# Patient Record
Sex: MALE | Race: WHITE | Employment: OTHER | URBAN - METROPOLITAN AREA
[De-identification: names, ages, dates, MRNs, and addresses within clinical notes are randomized per-mention and may not be internally consistent; named-entity substitution may affect disease eponyms.]

---

## 2017-06-28 ENCOUNTER — OFFICE VISIT (OUTPATIENT)
Dept: INTERNAL MEDICINE CLINIC | Facility: CLINIC | Age: 77
End: 2017-06-28

## 2017-06-28 VITALS
SYSTOLIC BLOOD PRESSURE: 122 MMHG | WEIGHT: 153 LBS | HEIGHT: 66 IN | DIASTOLIC BLOOD PRESSURE: 58 MMHG | BODY MASS INDEX: 24.59 KG/M2 | HEART RATE: 69 BPM

## 2017-06-28 DIAGNOSIS — D50.8 OTHER IRON DEFICIENCY ANEMIA: ICD-10-CM

## 2017-06-28 DIAGNOSIS — D03.39 MELANOMA IN SITU OF CHEEK (HCC): ICD-10-CM

## 2017-06-28 DIAGNOSIS — K22.70 BARRETT'S ESOPHAGUS WITHOUT DYSPLASIA: ICD-10-CM

## 2017-06-28 DIAGNOSIS — Z00.00 ANNUAL PHYSICAL EXAM: Primary | ICD-10-CM

## 2017-06-28 DIAGNOSIS — D01.0 CARCINOMA IN SITU OF COLON: ICD-10-CM

## 2017-06-28 DIAGNOSIS — K21.9 GASTROESOPHAGEAL REFLUX DISEASE, ESOPHAGITIS PRESENCE NOT SPECIFIED: ICD-10-CM

## 2017-06-28 DIAGNOSIS — Z00.00 ENCOUNTER FOR ANNUAL HEALTH EXAMINATION: ICD-10-CM

## 2017-06-28 DIAGNOSIS — D12.6 ADENOMATOUS POLYP OF COLON, UNSPECIFIED PART OF COLON: ICD-10-CM

## 2017-06-28 DIAGNOSIS — E78.00 HYPERCHOLESTEROLEMIA: ICD-10-CM

## 2017-06-28 PROCEDURE — G0439 PPPS, SUBSEQ VISIT: HCPCS | Performed by: INTERNAL MEDICINE

## 2017-06-28 NOTE — PATIENT INSTRUCTIONS
Please obtain blood tests soon when you can. Please continue to eat healthy, exercise regularly, and maintain your current body weight. Await colonoscopy and possible EGD in September. Continue regular follow-up with Dermatology.   Please remember to g covered service except at the Welcome to Medicare Visit    Abdominal aortic aneurysm screening (once between ages 73-68)  No results found for this or any previous visit.  Limited to patients who meet one of the following criteria:   • Men who are 65-75 yea for this or any previous visit.  Medium/high risk factors:   End-stage renal disease   Hemophiliacs who received Factor VIII or IX concentrates   Clients of institutions for the mentally retarded   Persons who live in the same house as a HepB virus carrier

## 2017-06-28 NOTE — PROGRESS NOTES
HPI:   Pedro Deluna is a 68year old male who presents this afternoon for a Medicare Subsequent Annual Wellness visit (Pt already had Initial Annual Wellness). He feels well today, and has no specific issues for discussion. Retired .   Jessika Buenrostro polyps     Stubbs's esophagus      Last Cholesterol Labs:     Lab Results  Component Value Date   CHOLEST 203 (H) 06/27/2016   HDL 35 06/27/2016    (H) 06/27/2016   TRIG 189 (H) 06/27/2016          Last Chemistry Labs:     Lab Results  Component Va quit smoking about 24 years ago. His smoking use included Cigarettes. He has a 36.00 pack-year smoking history. He does not have any smokeless tobacco history on file. He reports that he drinks alcohol. He reports that he does not use drugs.        REVIEW O get annoyed because I misunderstand what they say:  No   I misunderstand what others are saying and make inappropriate responses:  No I avoid social activities because I cannot hear well and fear I will reply improperly:  No   Family members and friends ha weight. Reinforced annual influenza vaccine. He will check on Zostavax status. Await upcoming colonoscopy and possible EGD in September.   Annual physical.    History of carcinoma in situ of colon  Stable status post resection with upcoming colonoscopy i B, Tetanus, or Pneumococcal?: Yes     Functional Ability     Bathing or Showering: Able without help    Toileting: Able without help    Dressing: Able without help    Eating: Able without help    Driving: Able without help    Preparing your meals: Able wit sheet to patient  PREVENTATIVE SERVICES  INDICATIONS AND SCHEDULE Internal Lab or Procedure External Lab or Procedure   Diabetes Screening      HbgA1C   Annually No results found for: A1C    No flowsheet data found.     Fasting Blood Sugar (FSB)Annually   G POTASSIUM (P) (mmol/L)   Date Value   06/27/2016 4.0    No flowsheet data found. Creatinine  Annually Creatinine (mg/dL)   Date Value   10/28/2016 1.18     CREATININE (P) (mg/dL)   Date Value   06/27/2016 1.26    No flowsheet data found.     Drug Ser

## 2017-09-22 ENCOUNTER — TELEPHONE (OUTPATIENT)
Dept: GASTROENTEROLOGY | Facility: CLINIC | Age: 77
End: 2017-09-22

## 2017-09-22 NOTE — TELEPHONE ENCOUNTER
Pt contacted and explained that I would send his instructions via SWYF for him to review and reference as needed. He was agreeable, instructions sent.

## 2017-09-22 NOTE — TELEPHONE ENCOUNTER
Pt indicates he received the prep for cln/procedure on 9/26 , pt is requesting the directions for the prep, pls call at:[phone],thanks.

## 2017-09-26 ENCOUNTER — LAB REQUISITION (OUTPATIENT)
Dept: LAB | Facility: HOSPITAL | Age: 77
End: 2017-09-26
Payer: MEDICARE

## 2017-09-26 DIAGNOSIS — Z01.89 ENCOUNTER FOR OTHER SPECIFIED SPECIAL EXAMINATIONS (CODE): ICD-10-CM

## 2017-09-26 PROCEDURE — 88305 TISSUE EXAM BY PATHOLOGIST: CPT | Performed by: INTERNAL MEDICINE

## 2017-09-27 ENCOUNTER — TELEPHONE (OUTPATIENT)
Dept: GASTROENTEROLOGY | Facility: CLINIC | Age: 77
End: 2017-09-27

## 2017-09-27 NOTE — TELEPHONE ENCOUNTER
----- Message from Osiel Mart MD sent at 9/27/2017 10:00 AM CDT -----  I wanted to get back to you with your colonoscopy results. You had  colon polyps removed which were benign.   I would advise a repeat colonoscopy in 1 year to make sure no new jackie

## 2017-10-11 ENCOUNTER — PATIENT MESSAGE (OUTPATIENT)
Dept: INTERNAL MEDICINE CLINIC | Facility: CLINIC | Age: 77
End: 2017-10-11

## 2018-05-08 ENCOUNTER — OFFICE VISIT (OUTPATIENT)
Dept: DERMATOLOGY CLINIC | Facility: CLINIC | Age: 78
End: 2018-05-08

## 2018-05-08 DIAGNOSIS — L82.1 SEBORRHEIC KERATOSES: ICD-10-CM

## 2018-05-08 DIAGNOSIS — L57.8 SUN-DAMAGED SKIN: ICD-10-CM

## 2018-05-08 DIAGNOSIS — D23.5 BENIGN NEOPLASM OF SKIN OF TRUNK, EXCEPT SCROTUM: ICD-10-CM

## 2018-05-08 DIAGNOSIS — D23.60 BENIGN NEOPLASM OF SKIN OF UPPER EXTREMITY AND SHOULDER, UNSPECIFIED LATERALITY: ICD-10-CM

## 2018-05-08 DIAGNOSIS — D23.70 BENIGN NEOPLASM OF SKIN OF LOWER EXTREMITY, INCLUDING HIP, UNSPECIFIED LATERALITY: ICD-10-CM

## 2018-05-08 DIAGNOSIS — D23.4 BENIGN NEOPLASM OF SCALP AND SKIN OF NECK: ICD-10-CM

## 2018-05-08 DIAGNOSIS — Z85.828 PERSONAL HISTORY OF SKIN CANCER: ICD-10-CM

## 2018-05-08 DIAGNOSIS — L57.0 ACTINIC KERATOSIS: ICD-10-CM

## 2018-05-08 DIAGNOSIS — D23.30 BENIGN NEOPLASM OF SKIN OF FACE: ICD-10-CM

## 2018-05-08 DIAGNOSIS — Z86.006 HISTORY OF MELANOMA IN SITU: Primary | ICD-10-CM

## 2018-05-08 PROCEDURE — 17000 DESTRUCT PREMALG LESION: CPT | Performed by: DERMATOLOGY

## 2018-05-08 PROCEDURE — 99203 OFFICE O/P NEW LOW 30 MIN: CPT | Performed by: DERMATOLOGY

## 2018-05-08 PROCEDURE — 17003 DESTRUCT PREMALG LES 2-14: CPT | Performed by: DERMATOLOGY

## 2018-05-08 NOTE — PROGRESS NOTES
HPI:     Chief Complaint     Lesion        HPI     Lesion    Additional comments: LOV: 7-14-14.  Pt presenting today with dark flat lesions to R upper thigh and rough patches to scalp and back and requesting full body skin eval. 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 Also exam of oral mucosa    The patient is alert, oriented, and appears their stated age.   Patient is well nourished and in no distress    The exam was remarkab use  of SPF 30 or higher, hats, discussed  Benign neoplasm of scalp and skin of neck  Benign neoplasm of skin of face  Benign neoplasm of skin of upper extremity and shoulder, unspecified laterality  Benign neoplasm of skin of lower extremity, including hi

## 2018-05-08 NOTE — PROGRESS NOTES
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

## 2018-10-03 ENCOUNTER — TELEPHONE (OUTPATIENT)
Dept: GASTROENTEROLOGY | Facility: CLINIC | Age: 78
End: 2018-10-03

## 2018-10-03 DIAGNOSIS — Z98.0 INTESTINAL BYPASS OR ANASTOMOSIS STATUS: Primary | ICD-10-CM

## 2018-10-03 DIAGNOSIS — Z86.010 HISTORY OF COLONIC POLYPS: ICD-10-CM

## 2018-10-03 DIAGNOSIS — K64.8 INTERNAL HEMORRHOIDS: ICD-10-CM

## 2018-10-03 NOTE — TELEPHONE ENCOUNTER
Last Procedure:  9/26/2017 CLN  Last Diagnosis:  The anastomosis of the right colon/ diverticulosis / internal hemorrhoids/ 3 colon polyps  Recalled for (years): 1 year  Sedation used previously:  MAC  Last Prep Used (if known):  Colyte   Quality of prep (

## 2018-10-15 NOTE — TELEPHONE ENCOUNTER
Scheduled for:   Colonoscopy [id number]  Provider Name: Dr. Will Baumann  Date:  11/10/18  Location:  [facility]  Sedation:  MAC  Time:  0730 (pt is aware to arrive at 0630)   Prep:  Colyte, sent via [employer]?:  Physician reviewed     Diagnosis with

## 2018-11-10 ENCOUNTER — HOSPITAL ENCOUNTER (OUTPATIENT)
Facility: HOSPITAL | Age: 78
Setting detail: HOSPITAL OUTPATIENT SURGERY
Discharge: HOME | End: 2018-11-10
Attending: INTERNAL MEDICINE | Admitting: INTERNAL MEDICINE
Payer: MEDICARE

## 2018-11-10 ENCOUNTER — ANESTHESIA EVENT (OUTPATIENT)
Dept: ENDOSCOPY | Facility: HOSPITAL | Age: 78
End: 2018-11-10
Payer: MEDICARE

## 2018-11-10 ENCOUNTER — ANESTHESIA (OUTPATIENT)
Dept: ENDOSCOPY | Facility: HOSPITAL | Age: 78
End: 2018-11-10
Payer: MEDICARE

## 2018-11-10 DIAGNOSIS — Z98.0 INTESTINAL BYPASS OR ANASTOMOSIS STATUS: ICD-10-CM

## 2018-11-10 DIAGNOSIS — Z86.010 HISTORY OF COLONIC POLYPS: ICD-10-CM

## 2018-11-10 DIAGNOSIS — K64.8 INTERNAL HEMORRHOIDS: ICD-10-CM

## 2018-11-10 PROCEDURE — 0DBP8ZX EXCISION OF RECTUM, VIA NATURAL OR ARTIFICIAL OPENING ENDOSCOPIC, DIAGNOSTIC: ICD-10-PCS | Performed by: INTERNAL MEDICINE

## 2018-11-10 PROCEDURE — 45380 COLONOSCOPY AND BIOPSY: CPT | Performed by: INTERNAL MEDICINE

## 2018-11-10 PROCEDURE — 45385 COLONOSCOPY W/LESION REMOVAL: CPT | Performed by: INTERNAL MEDICINE

## 2018-11-10 PROCEDURE — 0DBN8ZX EXCISION OF SIGMOID COLON, VIA NATURAL OR ARTIFICIAL OPENING ENDOSCOPIC, DIAGNOSTIC: ICD-10-PCS | Performed by: INTERNAL MEDICINE

## 2018-11-10 RX ORDER — ONDANSETRON 2 MG/ML
4 INJECTION INTRAMUSCULAR; INTRAVENOUS ONCE AS NEEDED
Status: DISCONTINUED | OUTPATIENT
Start: 2018-11-10 | End: 2018-11-10

## 2018-11-10 RX ORDER — SODIUM CHLORIDE, SODIUM LACTATE, POTASSIUM CHLORIDE, CALCIUM CHLORIDE 600; 310; 30; 20 MG/100ML; MG/100ML; MG/100ML; MG/100ML
INJECTION, SOLUTION INTRAVENOUS CONTINUOUS
Status: DISCONTINUED | OUTPATIENT
Start: 2018-11-10 | End: 2018-11-10

## 2018-11-10 RX ORDER — LIDOCAINE HYDROCHLORIDE 10 MG/ML
INJECTION, SOLUTION EPIDURAL; INFILTRATION; INTRACAUDAL; PERINEURAL AS NEEDED
Status: DISCONTINUED | OUTPATIENT
Start: 2018-11-10 | End: 2018-11-10 | Stop reason: SURG

## 2018-11-10 RX ORDER — NALOXONE HYDROCHLORIDE 0.4 MG/ML
80 INJECTION, SOLUTION INTRAMUSCULAR; INTRAVENOUS; SUBCUTANEOUS AS NEEDED
Status: DISCONTINUED | OUTPATIENT
Start: 2018-11-10 | End: 2018-11-10

## 2018-11-10 RX ADMIN — LIDOCAINE HYDROCHLORIDE 50 MG: 10 INJECTION, SOLUTION EPIDURAL; INFILTRATION; INTRACAUDAL; PERINEURAL at 07:37:00

## 2018-11-10 RX ADMIN — SODIUM CHLORIDE, SODIUM LACTATE, POTASSIUM CHLORIDE, CALCIUM CHLORIDE: 600; 310; 30; 20 INJECTION, SOLUTION INTRAVENOUS at 07:58:00

## 2018-11-10 RX ADMIN — SODIUM CHLORIDE, SODIUM LACTATE, POTASSIUM CHLORIDE, CALCIUM CHLORIDE: 600; 310; 30; 20 INJECTION, SOLUTION INTRAVENOUS at 07:35:00

## 2018-11-10 NOTE — H&P
History & Physical Examination    Patient Name: Alma Rosa Boudreaux  MRN: [id number]  [facility]: [id number]  YOB: 1940    Diagnosis: history of cancerous colon polyp    Medications Prior to Admission:  Pseudoephedrine-Acetaminophen (ALLEREST OR) Take 1 Problem Relation Age of Onset   • Breast Cancer Mother 79        Metastatic to brain   • Breast Cancer Sister 48   • Hypertension Brother    • Diabetes Brother    • Other (COPD) Father    • Other (COPD) Sister      Social History    Tobacco Use      Smok

## 2018-11-10 NOTE — OPERATIVE REPORT
[facility] [place] [facility] Endoscopy Report      Preoperative Diagnosis:  - cancerous colon polyp surgically removed      Postoperative Diagnosis:  - colon polyps x 4  - patent anastomosis  - diverticulosis  - internal hemorrhoids      Procedure:    Colono

## 2018-11-10 NOTE — ANESTHESIA PREPROCEDURE EVALUATION
Anesthesia PreOp Note    HPI:     Aroldo Linton is a 68year old male who presents for preoperative consultation requested by: Curtis Garcia MD    Date of Surgery: 11/10/2018    Procedure(s):  COLONOSCOPY  Indication: Intestinal bypass or anastomosis COLECTOMY, PARTIAL, W/ ANASTOMOS Right October 2016   • REPAIR RECURR [id number] [facility] [address] Right 1975    With right orchiectomy   • SKIN SURGERY  July 2014    Excision melanoma in situ right cheek and BCCa scalp   • TONSILLECTOMY  1948         Medications tobacco: Never Used    Substance and Sexual Activity      Alcohol use:  Yes        Alcohol/week: 0.0 oz        Comment: Infrequent      Drug use: No      Sexual activity: Not on file    Other Topics      Concerns:        Grew up on a farm: Not Asked spouse  Discussed plan with:  Surgeon      I have informed Claudette Langton and/or legal guardian or family member of the nature of the anesthetic plan, benefits, risks including possible dental damage if relevant, major complications, and any alternative fo

## 2018-11-10 NOTE — ANESTHESIA POSTPROCEDURE EVALUATION
Patient: Bisi Adams    Procedure Summary     Date:  11/10/18 Room / Location:  [facility] ENDOSCOPY 01 / [facility] ENDOSCOPY    Anesthesia Start:  9015 Anesthesia Stop:  6786    Procedure:  COLONOSCOPY (N/A ) Diagnosis:       Intestinal bypass or anastomosis status

## 2018-11-12 ENCOUNTER — OFFICE VISIT (OUTPATIENT)
Dept: DERMATOLOGY CLINIC | Facility: CLINIC | Age: 78
End: 2018-11-12
Payer: MEDICARE

## 2018-11-12 ENCOUNTER — TELEPHONE (OUTPATIENT)
Dept: GASTROENTEROLOGY | Facility: CLINIC | Age: 78
End: 2018-11-12

## 2018-11-12 VITALS — BODY MASS INDEX: 24.91 KG/M2 | HEIGHT: 66 IN | WEIGHT: 155 LBS

## 2018-11-12 VITALS
OXYGEN SATURATION: 96 % | HEIGHT: 66 IN | DIASTOLIC BLOOD PRESSURE: 65 MMHG | SYSTOLIC BLOOD PRESSURE: 116 MMHG | RESPIRATION RATE: 14 BRPM | WEIGHT: 160 LBS | HEART RATE: 69 BPM | BODY MASS INDEX: 25.71 KG/M2

## 2018-11-12 DIAGNOSIS — D23.30 BENIGN NEOPLASM OF SKIN OF FACE: ICD-10-CM

## 2018-11-12 DIAGNOSIS — D23.60 BENIGN NEOPLASM OF SKIN OF UPPER EXTREMITY AND SHOULDER, UNSPECIFIED LATERALITY: ICD-10-CM

## 2018-11-12 DIAGNOSIS — Z85.828 PERSONAL HISTORY OF SKIN CANCER: ICD-10-CM

## 2018-11-12 DIAGNOSIS — D23.4 BENIGN NEOPLASM OF SCALP AND SKIN OF NECK: ICD-10-CM

## 2018-11-12 DIAGNOSIS — L57.8 SUN-DAMAGED SKIN: ICD-10-CM

## 2018-11-12 DIAGNOSIS — D23.5 BENIGN NEOPLASM OF SKIN OF TRUNK, EXCEPT SCROTUM: ICD-10-CM

## 2018-11-12 DIAGNOSIS — Z86.006 HISTORY OF MELANOMA IN SITU: ICD-10-CM

## 2018-11-12 DIAGNOSIS — L57.0 ACTINIC KERATOSIS: Primary | ICD-10-CM

## 2018-11-12 DIAGNOSIS — L82.1 SEBORRHEIC KERATOSES: ICD-10-CM

## 2018-11-12 PROCEDURE — 99213 OFFICE O/P EST LOW 20 MIN: CPT | Performed by: DERMATOLOGY

## 2018-11-12 PROCEDURE — G0463 HOSPITAL OUTPT CLINIC VISIT: HCPCS | Performed by: DERMATOLOGY

## 2018-11-12 RX ORDER — FLUOROURACIL 50 MG/G
1 CREAM TOPICAL EVERY EVENING
Qty: 40 G | Refills: 0 | Status: SHIPPED | OUTPATIENT
Start: 2018-11-12 | End: 2018-12-10

## 2018-11-12 NOTE — PROGRESS NOTES
HPI:     Chief Complaint     Upper Body Exam        HPI     Upper Body Exam      Additional comments: 6 month check ,patient concerned with some ruff patches of skin on forehead ,personal HX of AK's and Melanoma           Last edited by James Hardin CM deficiency anemia June 2016   • [facility]) July 2014   • Paraesophageal hiatal hernia    • Pneumonia 1985     Past Surgical History:   Procedure Laterality Date   • APPENDECTOMY  1956   • CATARACT EXTRACTION Bilateral  2015   • COLECTO Asked        Caffeine Concern: Yes          Coffee 3 cups daily        Occupational Exposure: Not Asked        Hobby Hazards: Not Asked        Sleep Concern: Not Asked        Stress Concern: Not Asked        Weight Concern: Not Asked        Special Diet: N including marked inflammation. Patient will like to wait to start until after a wedding that is right after Thanksgiving. I told him that is fine. We will therefore recheck in 3 months.   No recurrent or suspicious lesions–patient will follow-up in 6 mon

## 2018-11-12 NOTE — PATIENT INSTRUCTIONS
After the wedding you are going to, start applying the 5-fluorouracil cream every evening for 4 weeks. It will get red crusty, and more scaly at that time. May use Vaseline in the morning to help soothe.

## 2018-11-12 NOTE — TELEPHONE ENCOUNTER
Entered into Epic:Recall colon in 3 years per Dr. Salomón Higgins. Last Colon done 11/10/18, next due 11/10/21. Snapshot updated. Letter mailed.

## 2018-11-12 NOTE — TELEPHONE ENCOUNTER
----- Message from Michelle Jensen MD sent at 11/12/2018  2:47 PM CST -----  I wanted to get back to you with your colonoscopy results. You had 4 colon polyps removed which were benign.   I would advise a repeat colonoscopy in 3 years to make sure no new

## 2019-02-18 ENCOUNTER — OFFICE VISIT (OUTPATIENT)
Dept: DERMATOLOGY CLINIC | Facility: CLINIC | Age: 79
End: 2019-02-18
Payer: MEDICARE

## 2019-02-18 DIAGNOSIS — L57.0 ACTINIC KERATOSIS: ICD-10-CM

## 2019-02-18 DIAGNOSIS — L21.9 SEBORRHEIC DERMATITIS: Primary | ICD-10-CM

## 2019-02-18 DIAGNOSIS — Z86.006 HISTORY OF MELANOMA IN SITU: ICD-10-CM

## 2019-02-18 PROCEDURE — G0463 HOSPITAL OUTPT CLINIC VISIT: HCPCS | Performed by: DERMATOLOGY

## 2019-02-18 PROCEDURE — 99213 OFFICE O/P EST LOW 20 MIN: CPT | Performed by: DERMATOLOGY

## 2019-02-18 RX ORDER — KETOCONAZOLE 20 MG/ML
SHAMPOO TOPICAL
Qty: 120 ML | Refills: 3 | Status: SHIPPED | OUTPATIENT
Start: 2019-02-18 | End: 2019-09-16

## 2019-02-18 NOTE — PROGRESS NOTES
HPI:     Chief Complaint     Lesion        HPI     Lesion      Additional comments: Patient present for follow up - lesions has healed with treatment          Last edited by Michelle Elaine on 2/18/2019  1:24 PM. (History)        Of note patient was seen cheek [facility]) July 2014   • Paraesophageal hiatal hernia    • Pneumonia 1985     Past Surgical History:   Procedure Laterality Date   • APPENDECTOMY  1956   • CATARACT EXTRACTION Bilateral  2015   • COLECTOMY     • COLONOSCOPY N/A 11/10/2018    Performed by member of club or organization: Not on file        Attends meetings of clubs or organizations: Not on file        Relationship status: Not on file      Intimate partner violence:        Fear of current or ex partner: Not on file        Emotionally abused: exposed to actinic damage or actinic keratoses. I would therefore like to treat with ketoconazole shampoo to be used twice weekly and 2-1/2% hydrocortisone cream daily as needed only for scaling or dryness.   We will reassess when he comes back for his nex

## 2019-05-10 ENCOUNTER — PATIENT MESSAGE (OUTPATIENT)
Dept: DERMATOLOGY CLINIC | Facility: CLINIC | Age: 79
End: 2019-05-10

## 2019-05-10 NOTE — TELEPHONE ENCOUNTER
From: Maite Jacobs  To: Kishore Crowe MD  Sent: 5/10/2019 1:17 PM CDT  Subject: Other    I have an appointment scheduled for May 13, 2019 that I must cancel. I will not be in [address] on that date. I will have to re-schedule for later in the month.

## 2019-06-17 ENCOUNTER — NURSE TRIAGE (OUTPATIENT)
Dept: INTERNAL MEDICINE CLINIC | Facility: CLINIC | Age: 79
End: 2019-06-17

## 2019-06-17 ENCOUNTER — OFFICE VISIT (OUTPATIENT)
Dept: INTERNAL MEDICINE CLINIC | Facility: CLINIC | Age: 79
End: 2019-06-17
Payer: MEDICARE

## 2019-06-17 VITALS
HEART RATE: 71 BPM | DIASTOLIC BLOOD PRESSURE: 80 MMHG | HEIGHT: 64.5 IN | WEIGHT: 158 LBS | BODY MASS INDEX: 26.65 KG/M2 | TEMPERATURE: 98 F | SYSTOLIC BLOOD PRESSURE: 138 MMHG

## 2019-06-17 DIAGNOSIS — K62.5 RECTAL BLEEDING: Primary | ICD-10-CM

## 2019-06-17 PROCEDURE — 99213 OFFICE O/P EST LOW 20 MIN: CPT | Performed by: INTERNAL MEDICINE

## 2019-06-17 PROCEDURE — G0463 HOSPITAL OUTPT CLINIC VISIT: HCPCS | Performed by: INTERNAL MEDICINE

## 2019-06-17 NOTE — PATIENT INSTRUCTIONS
Monitor closely for recurrent bleeding. Use sitz baths twice daily as needed. Use Anusol suppositories twice daily as needed. Follow-up with Dr. Cris Valdez if bleeding recurs and persists.

## 2019-06-17 NOTE — PROGRESS NOTES
Loco Prasad is a 66year old male.  Patient presents with:  Blood In Stool    HPI:   On Saturday, 2 days ago, he had a single episode of rectal bleeding, noticing bright red blood in small amounts both in the toilet water after a bowel movement and also disease)    • Iron deficiency anemia June 2016   • Melanoma in situ of [facility]) July 2014   • Paraesophageal hiatal hernia    • Pneumonia 1985     Past Surgical History:   Procedure Laterality Date   • APPENDECTOMY  1956   • CATARACT EXTRACTION Bilatera hemorrhoidal.  Most recent colonoscopy November 2018. Close observation for recurrence. Sitz bath's twice daily as needed. Anusol suppositories 25 mg twice daily as needed. Prescription sent to pharmacy.   Recommend follow-up with GI if bleeding recurs

## 2019-06-17 NOTE — TELEPHONE ENCOUNTER
Action Requested: Summary for Provider     []  Critical Lab, Recommendations Needed  [] Need Additional Advice  []   FYI    []   Need Orders  [] Need Medications Sent to Pharmacy  []  Other     SUMMARY: Per protocol advised OV today.  Scheduled today with D

## 2019-08-12 ENCOUNTER — PATIENT MESSAGE (OUTPATIENT)
Dept: GASTROENTEROLOGY | Facility: CLINIC | Age: 79
End: 2019-08-12

## 2019-08-13 NOTE — TELEPHONE ENCOUNTER
From: Daniel Bishop  To: Evelyne Zamorano MD  Sent: 8/12/2019 9:19 PM CDT  Subject: Other    I need to cancel my appointment for Aug 13th. I am not in [place] at the moment and cannot make it.

## 2019-09-16 ENCOUNTER — OFFICE VISIT (OUTPATIENT)
Dept: INTERNAL MEDICINE CLINIC | Facility: CLINIC | Age: 79
End: 2019-09-16
Payer: MEDICARE

## 2019-09-16 VITALS
DIASTOLIC BLOOD PRESSURE: 76 MMHG | WEIGHT: 159.69 LBS | BODY MASS INDEX: 26.6 KG/M2 | HEART RATE: 106 BPM | HEIGHT: 65 IN | SYSTOLIC BLOOD PRESSURE: 132 MMHG

## 2019-09-16 DIAGNOSIS — K22.70 BARRETT'S ESOPHAGUS WITHOUT DYSPLASIA: ICD-10-CM

## 2019-09-16 DIAGNOSIS — D12.6 ADENOMATOUS POLYP OF COLON, UNSPECIFIED PART OF COLON: ICD-10-CM

## 2019-09-16 DIAGNOSIS — I70.0 AORTIC ATHEROSCLEROSIS (HCC): ICD-10-CM

## 2019-09-16 DIAGNOSIS — Z00.00 ANNUAL PHYSICAL EXAM: Primary | ICD-10-CM

## 2019-09-16 DIAGNOSIS — E78.00 HYPERCHOLESTEROLEMIA: ICD-10-CM

## 2019-09-16 DIAGNOSIS — Z86.006 HISTORY OF MELANOMA IN SITU: ICD-10-CM

## 2019-09-16 DIAGNOSIS — D01.0 CARCINOMA IN SITU OF COLON: ICD-10-CM

## 2019-09-16 DIAGNOSIS — Z00.00 ENCOUNTER FOR ANNUAL HEALTH EXAMINATION: ICD-10-CM

## 2019-09-16 DIAGNOSIS — R91.8 LUNG FIELD ABNORMAL FINDING ON EXAMINATION: ICD-10-CM

## 2019-09-16 DIAGNOSIS — D50.8 OTHER IRON DEFICIENCY ANEMIA: ICD-10-CM

## 2019-09-16 DIAGNOSIS — K21.9 GASTROESOPHAGEAL REFLUX DISEASE, ESOPHAGITIS PRESENCE NOT SPECIFIED: ICD-10-CM

## 2019-09-16 PROCEDURE — 90662 IIV NO PRSV INCREASED AG IM: CPT | Performed by: INTERNAL MEDICINE

## 2019-09-16 PROCEDURE — G0008 ADMIN INFLUENZA VIRUS VAC: HCPCS | Performed by: INTERNAL MEDICINE

## 2019-09-16 PROCEDURE — G0439 PPPS, SUBSEQ VISIT: HCPCS | Performed by: INTERNAL MEDICINE

## 2019-09-16 PROCEDURE — 99213 OFFICE O/P EST LOW 20 MIN: CPT | Performed by: INTERNAL MEDICINE

## 2019-09-16 PROCEDURE — G0463 HOSPITAL OUTPT CLINIC VISIT: HCPCS | Performed by: INTERNAL MEDICINE

## 2019-09-17 ENCOUNTER — HOSPITAL ENCOUNTER (OUTPATIENT)
Dept: GENERAL RADIOLOGY | Facility: HOSPITAL | Age: 79
Discharge: HOME | End: 2019-09-17
Attending: INTERNAL MEDICINE
Payer: MEDICARE

## 2019-09-17 ENCOUNTER — APPOINTMENT (OUTPATIENT)
Dept: LAB | Facility: HOSPITAL | Age: 79
End: 2019-09-17
Attending: INTERNAL MEDICINE
Payer: MEDICARE

## 2019-09-17 DIAGNOSIS — E78.00 HYPERCHOLESTEROLEMIA: ICD-10-CM

## 2019-09-17 DIAGNOSIS — D50.8 OTHER IRON DEFICIENCY ANEMIA: ICD-10-CM

## 2019-09-17 DIAGNOSIS — R91.8 LUNG FIELD ABNORMAL FINDING ON EXAMINATION: ICD-10-CM

## 2019-09-17 LAB
ALBUMIN SERPL-MCNC: 3.9 G/DL (ref 3.4–5)
ALBUMIN/GLOB SERPL: 1.2 {RATIO} (ref 1–2)
ALP LIVER SERPL-CCNC: 81 U/L (ref 45–117)
ALT SERPL-CCNC: 20 U/L (ref 16–61)
ANION GAP SERPL CALC-SCNC: 6 MMOL/L (ref 0–18)
AST SERPL-CCNC: 20 U/L (ref 15–37)
BILIRUB SERPL-MCNC: 0.7 MG/DL (ref 0.1–2)
BUN BLD-MCNC: 15 MG/DL (ref 7–18)
BUN/CREAT SERPL: 10.2 (ref 10–20)
CALCIUM BLD-MCNC: 9.3 MG/DL (ref 8.5–10.1)
CHLORIDE SERPL-SCNC: 106 MMOL/L (ref 98–112)
CHOLEST SMN-MCNC: 207 MG/DL (ref ?–200)
CO2 SERPL-SCNC: 29 MMOL/L (ref 21–32)
CREAT BLD-MCNC: 1.47 MG/DL (ref 0.7–1.3)
DEPRECATED RDW RBC AUTO: 44.8 FL (ref 35.1–46.3)
ERYTHROCYTE [DISTWIDTH] IN BLOOD BY AUTOMATED COUNT: 13.2 % (ref 11–15)
GLOBULIN PLAS-MCNC: 3.2 G/DL (ref 2.8–4.4)
GLUCOSE BLD-MCNC: 105 MG/DL (ref 70–99)
HCT VFR BLD AUTO: 45.4 % (ref 39–53)
HDLC SERPL-MCNC: 34 MG/DL (ref 40–59)
HGB BLD-MCNC: 15.1 G/DL (ref 13–17.5)
LDLC SERPL CALC-MCNC: 135 MG/DL (ref ?–100)
M PROTEIN MFR SERPL ELPH: 7.1 G/DL (ref 6.4–8.2)
MCH RBC QN AUTO: 30.6 PG (ref 26–34)
MCHC RBC AUTO-ENTMCNC: 33.3 G/DL (ref 31–37)
MCV RBC AUTO: 92.1 FL (ref 80–100)
NONHDLC SERPL-MCNC: 173 MG/DL (ref ?–130)
OSMOLALITY SERPL CALC.SUM OF ELEC: 293 MOSM/KG (ref 275–295)
PATIENT FASTING: YES
PATIENT FASTING: YES
PLATELET # BLD AUTO: 248 10(3)UL (ref 150–450)
POTASSIUM SERPL-SCNC: 4.4 MMOL/L (ref 3.5–5.1)
RBC # BLD AUTO: 4.93 X10(6)UL (ref 3.8–5.8)
SODIUM SERPL-SCNC: 141 MMOL/L (ref 136–145)
TRIGL SERPL-MCNC: 190 MG/DL (ref 30–149)
VLDLC SERPL CALC-MCNC: 38 MG/DL (ref 0–30)
WBC # BLD AUTO: 8.5 X10(3) UL (ref 4–11)

## 2019-09-17 PROCEDURE — 71046 X-RAY EXAM CHEST 2 VIEWS: CPT | Performed by: INTERNAL MEDICINE

## 2019-09-17 PROCEDURE — 85027 COMPLETE CBC AUTOMATED: CPT

## 2019-09-17 PROCEDURE — 36415 COLL VENOUS BLD VENIPUNCTURE: CPT

## 2019-09-17 PROCEDURE — 80053 COMPREHEN METABOLIC PANEL: CPT

## 2019-09-17 PROCEDURE — 80061 LIPID PANEL: CPT

## 2019-09-17 NOTE — PROGRESS NOTES
HPI:   Edu Shelton is a 66year old male who presents this evening for a Medicare Subsequent Annual Wellness visit (Pt already had Initial Annual Wellness). His last Medicare physical was June 2017. Labs ordered at that time were not done.   He feels Discussed         He has a Power of  for [employer] on file [facility] He smoked tobacco in the past but quit greater than 12 months ago. Social History    Tobacco Use      Smoking status: Former Smoker        Packs/day: 1.00        Years: 39. 0 Medications Marked as Taking for the 9/16/19 encounter (Office Visit) with Denver Silber, MD:  Pseudoephedrine-Acetaminophen (ALLEREST OR) Take 1 tablet by mouth daily. Ferrous Sulfate (IRON) 325 (65 FE) MG Oral Tab Take 65 mg by mouth [email or web address].      Landrum Merlin anorexia heartburn dysphagia nausea vomiting abdominal pain diarrhea constipation or rectal bleeding  : Occasional urinary frequency.   No nocturia dysuria or hematuria  MUSCULOSKELETAL: No leg swelling  NEURO: No headaches    EXAM:   /76   Pulse 10 Uncorrected Left Eye Chart Acuity: 20/80   Both Eyes Visual Acuity: Uncorrected Both Eyes Chart Acuity: 20/60   Able To Tolerate Visual Acuity: Yes        EXAM:   GENERAL: Pleasant male appearing well in no distress  /76   Pulse 106   Ht 5' 5\" (1.65 anemia  On iron supplementation. Await labs  -     CBC, PLATELET; NO DIFFERENTIAL;  Future    History of carcinoma in situ of colon  Stable status post colon resection    History of adenomatous polyp of colon, unspecified part of colon  Up-to-date on colon every 10 years Colonoscopy due on 11/10/2021 Update [facility] if applicable    Flex Sigmoidoscopy Screen every 10 years No results found for this or any previous visit. No flowsheet data found.      Fecal Occult Blood Annually No results found for:

## 2019-09-17 NOTE — PATIENT INSTRUCTIONS
You received a high-dose flu shot today. Please obtain blood tests and a chest x-ray soon when you can. Continue healthy diet and regular walking.   Annual physical.  Lyric Dominique's SCREENING SCHEDULE   Tests on this list are recommended by your physici Men who are 73-68 years old and have smoked more than 100 cigarettes in their lifetime   • Anyone with a family history    Colorectal Cancer Screening Covered up to Age 76     Colonoscopy Screen   Covered every 10 years- more often if abnormal Colonoscopy live in the same house as a HepB virus carrier   Homosexual men   Illicit injectable drug abusers     Tetanus Toxoid- Only covered with a cut with metal- TD and TDaP Not covered by Medicare Part B) No orders found for this or any previous visit.  This may b

## 2019-10-28 ENCOUNTER — OFFICE VISIT (OUTPATIENT)
Dept: DERMATOLOGY CLINIC | Facility: CLINIC | Age: 79
End: 2019-10-28
Payer: MEDICARE

## 2019-10-28 DIAGNOSIS — L57.8 SUN-DAMAGED SKIN: ICD-10-CM

## 2019-10-28 DIAGNOSIS — D23.60 BENIGN NEOPLASM OF SKIN OF UPPER EXTREMITY AND SHOULDER, UNSPECIFIED LATERALITY: ICD-10-CM

## 2019-10-28 DIAGNOSIS — D23.70 BENIGN NEOPLASM OF SKIN OF LOWER EXTREMITY, INCLUDING HIP, UNSPECIFIED LATERALITY: ICD-10-CM

## 2019-10-28 DIAGNOSIS — D23.4 BENIGN NEOPLASM OF SCALP AND SKIN OF NECK: ICD-10-CM

## 2019-10-28 DIAGNOSIS — D23.30 BENIGN NEOPLASM OF SKIN OF FACE: ICD-10-CM

## 2019-10-28 DIAGNOSIS — D23.5 BENIGN NEOPLASM OF SKIN OF TRUNK, EXCEPT SCROTUM: ICD-10-CM

## 2019-10-28 DIAGNOSIS — Z86.006 HISTORY OF MELANOMA IN SITU: ICD-10-CM

## 2019-10-28 DIAGNOSIS — L57.0 ACTINIC KERATOSIS: Primary | ICD-10-CM

## 2019-10-28 DIAGNOSIS — Z85.828 PERSONAL HISTORY OF SKIN CANCER: ICD-10-CM

## 2019-10-28 DIAGNOSIS — L82.1 SEBORRHEIC KERATOSES: ICD-10-CM

## 2019-10-28 PROCEDURE — 17003 DESTRUCT PREMALG LES 2-14: CPT | Performed by: DERMATOLOGY

## 2019-10-28 PROCEDURE — 17000 DESTRUCT PREMALG LESION: CPT | Performed by: DERMATOLOGY

## 2019-10-28 PROCEDURE — G0463 HOSPITAL OUTPT CLINIC VISIT: HCPCS | Performed by: DERMATOLOGY

## 2019-10-28 PROCEDURE — 99214 OFFICE O/P EST MOD 30 MIN: CPT | Performed by: DERMATOLOGY
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
## 2020-01-28 ENCOUNTER — OFFICE VISIT (OUTPATIENT)
Dept: DERMATOLOGY CLINIC | Facility: CLINIC | Age: 80
End: 2020-01-28
Payer: MEDICARE

## 2020-01-28 DIAGNOSIS — Z86.006 HISTORY OF MELANOMA IN SITU: ICD-10-CM

## 2020-01-28 DIAGNOSIS — L57.0 ACTINIC KERATOSIS: Primary | ICD-10-CM

## 2020-01-28 PROCEDURE — 17003 DESTRUCT PREMALG LES 2-14: CPT | Performed by: DERMATOLOGY

## 2020-01-28 PROCEDURE — 99212 OFFICE O/P EST SF 10 MIN: CPT | Performed by: DERMATOLOGY

## 2020-01-28 PROCEDURE — G0463 HOSPITAL OUTPT CLINIC VISIT: HCPCS | Performed by: DERMATOLOGY

## 2020-01-28 PROCEDURE — 17000 DESTRUCT PREMALG LESION: CPT | Performed by: DERMATOLOGY

## 2020-01-28 NOTE — PROGRESS NOTES
HPI:     Chief Complaint     Actinic Keratosis        HPI     Actinic Keratosis      Additional comments: LOV 10/28/19. pt presenting today with 3 month Ak f/u to scalp. no new concerns. pt has HX of BCC, Melanoma          Last edited by Camila Lee hernia    • Pneumonia 1985     Past Surgical History:   Procedure Laterality Date   • APPENDECTOMY  1956   • CATARACT EXTRACTION Bilateral  2015   • COLONOSCOPY N/A 11/10/2018    Performed by Louie Horvath MD at [address] [place] [address] ENDOSCOPY   • COLONOSCOPY & POLYPECT Attends meetings of clubs or organizations: Not on file        Relationship status: Not on file      Intimate partner violence:        Fear of current or ex partner: Not on file        Emotionally abused: Not on file        Physically abused: Not on file at this time. Patient will follow-up in 6 months for another full check. Monthly self exams. History of melanoma in situ-no evidence of recurrence–monthly self exams.   Patient will follow-up in 6 months for full exam.    Orders Placed This Encounter

## 2020-03-30 ENCOUNTER — TELEPHONE (OUTPATIENT)
Dept: INTERNAL MEDICINE CLINIC | Facility: CLINIC | Age: 80
End: 2020-03-30

## 2020-08-25 ENCOUNTER — OFFICE VISIT (OUTPATIENT)
Dept: DERMATOLOGY CLINIC | Facility: CLINIC | Age: 80
End: 2020-08-25
Payer: MEDICARE

## 2020-08-25 DIAGNOSIS — D23.60 BENIGN NEOPLASM OF SKIN OF UPPER EXTREMITY AND SHOULDER, UNSPECIFIED LATERALITY: ICD-10-CM

## 2020-08-25 DIAGNOSIS — L57.0 ACTINIC KERATOSIS: Primary | ICD-10-CM

## 2020-08-25 DIAGNOSIS — L57.8 SUN-DAMAGED SKIN: ICD-10-CM

## 2020-08-25 DIAGNOSIS — D23.70 BENIGN NEOPLASM OF SKIN OF LOWER EXTREMITY, INCLUDING HIP, UNSPECIFIED LATERALITY: ICD-10-CM

## 2020-08-25 DIAGNOSIS — Z86.006 HISTORY OF MELANOMA IN SITU: ICD-10-CM

## 2020-08-25 DIAGNOSIS — D23.30 BENIGN NEOPLASM OF SKIN OF FACE: ICD-10-CM

## 2020-08-25 DIAGNOSIS — Z85.828 HISTORY OF BASAL CELL CARCINOMA: ICD-10-CM

## 2020-08-25 DIAGNOSIS — D23.5 BENIGN NEOPLASM OF SKIN OF TRUNK, EXCEPT SCROTUM: ICD-10-CM

## 2020-08-25 DIAGNOSIS — L82.1 SEBORRHEIC KERATOSES: ICD-10-CM

## 2020-08-25 DIAGNOSIS — D23.4 BENIGN NEOPLASM OF SCALP AND SKIN OF NECK: ICD-10-CM

## 2020-08-25 PROCEDURE — 99214 OFFICE O/P EST MOD 30 MIN: CPT | Performed by: DERMATOLOGY

## 2020-08-25 PROCEDURE — 17000 DESTRUCT PREMALG LESION: CPT | Performed by: DERMATOLOGY

## 2020-08-25 PROCEDURE — G0463 HOSPITAL OUTPT CLINIC VISIT: HCPCS | Performed by: DERMATOLOGY

## 2020-08-25 PROCEDURE — 17003 DESTRUCT PREMALG LES 2-14: CPT | Performed by: DERMATOLOGY

## 2020-08-25 NOTE — PROGRESS NOTES
HPI:     Chief Complaint     Full Skin Exam        HPI     Full Skin Exam      Additional comments: LOV 1/28/2020 Patient present for full body skin exam . 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  Patient is well nourished and in no distress    The exam was remarkable for the following:  - there is no visible or palpable evidence of recurrent melanoma in situ from right cheek. Also no evidence of recurrent basal cell carcinoma from the scalp. extremity and shoulder, unspecified laterality  Benign neoplasm of skin of lower extremity, including hip, unspecified laterality    Orders Placed This Encounter      DESTRUCTION PREMALIGNANT LESIONS, FIRST LES      DESTRUCTION PREMALIGNANT LESIONS, 2ND -

## 2021-01-15 ENCOUNTER — PATIENT MESSAGE (OUTPATIENT)
Dept: GASTROENTEROLOGY | Facility: CLINIC | Age: 81
End: 2021-01-15

## 2021-01-15 NOTE — TELEPHONE ENCOUNTER
From: Cristina Terrell  To: Eldon Horan. Madan Barone MD  Sent: 1/15/2021 11:55 AM CST  Subject: Non-Urgent Medical Question    Need to schedule a colonoscopy examination for October 2021. Just thinking ahead. My health is good, and I want to keep it that way!

## 2021-01-15 NOTE — TELEPHONE ENCOUNTER
Last Procedure, Date, MD:  Colonoscopy Dr. Wiley Waldrop 11/10/2018  Last Diagnosis:  Colon polyps x4, patent anastomosis, diverticulosis, internal hemorrhoids  Recalled for (mth/yrs): 3 years  Sedation used previously:  MAC  Last Prep Used (if known):  Ellyn Hooper

## 2021-01-15 NOTE — TELEPHONE ENCOUNTER
Viewed by Suzanna Elliott on 9/23/2019  9:15 AM  Written by Kamryn Persaud MD on 11/12/2018  2:47 PM  I wanted to get back to you with your colonoscopy results.  You had 4 colon polyps removed which were benign.  I would advise a repeat colonoscopy in 3 A. Sigmoid colon polyp; polypectomy:   · Fragment of hyperplastic polyp demonstrating focal features of sessile serrated adenoma.     B.  Rectal polyps; polypectomy:   · Fragments of hyperplastic polyps.      Electronically signed by Aundrea Mcallister MD on 1

## 2021-01-15 NOTE — TELEPHONE ENCOUNTER
[facility] Endoscopy Report        Preoperative Diagnosis:  - cancerous colon polyp surgically removed        Postoperative Diagnosis:  - colon polyps x 4  - patent anastomosis  - diverticulosis  - internal hemorrhoids        Procedure:

## 2021-01-18 ENCOUNTER — TELEPHONE (OUTPATIENT)
Dept: GASTROENTEROLOGY | Facility: CLINIC | Age: 81
End: 2021-01-18

## 2021-01-18 DIAGNOSIS — Z86.010 PERSONAL HISTORY OF COLONIC POLYPS: Primary | ICD-10-CM

## 2021-01-18 RX ORDER — SODIUM, POTASSIUM,MAG SULFATES 17.5-3.13G
SOLUTION, RECONSTITUTED, ORAL ORAL
Qty: 1 BOTTLE | Refills: 0 | Status: SHIPPED | OUTPATIENT
Start: 2021-01-18 | End: 2022-01-21

## 2021-01-18 NOTE — TELEPHONE ENCOUNTER
Scheduling: See additional documentation in patient message dated 1/15/2021      The patient's chart has been reviewed. Okay to schedule pt for 3 year colonoscopy recall r/t history of colon polyps with Dr. Patricia Howard.      Advise MAC sedation per previous proce

## 2021-01-19 ENCOUNTER — OFFICE VISIT (OUTPATIENT)
Dept: INTERNAL MEDICINE CLINIC | Facility: CLINIC | Age: 81
End: 2021-01-19
Payer: MEDICARE

## 2021-01-19 VITALS
DIASTOLIC BLOOD PRESSURE: 82 MMHG | SYSTOLIC BLOOD PRESSURE: 132 MMHG | BODY MASS INDEX: 26.24 KG/M2 | HEART RATE: 67 BPM | WEIGHT: 157.5 LBS | HEIGHT: 65 IN

## 2021-01-19 DIAGNOSIS — D01.0 CARCINOMA IN SITU OF COLON: ICD-10-CM

## 2021-01-19 DIAGNOSIS — K22.70 BARRETT'S ESOPHAGUS WITHOUT DYSPLASIA: ICD-10-CM

## 2021-01-19 DIAGNOSIS — Z00.00 ANNUAL PHYSICAL EXAM: Primary | ICD-10-CM

## 2021-01-19 DIAGNOSIS — I70.0 AORTIC ATHEROSCLEROSIS (HCC): ICD-10-CM

## 2021-01-19 DIAGNOSIS — D12.6 ADENOMATOUS POLYP OF COLON, UNSPECIFIED PART OF COLON: ICD-10-CM

## 2021-01-19 DIAGNOSIS — Z00.00 ENCOUNTER FOR ANNUAL HEALTH EXAMINATION: ICD-10-CM

## 2021-01-19 DIAGNOSIS — N18.30 STAGE 3 CHRONIC KIDNEY DISEASE, UNSPECIFIED WHETHER STAGE 3A OR 3B CKD (HCC): ICD-10-CM

## 2021-01-19 DIAGNOSIS — D50.8 OTHER IRON DEFICIENCY ANEMIA: ICD-10-CM

## 2021-01-19 DIAGNOSIS — E78.5 DYSLIPIDEMIA: ICD-10-CM

## 2021-01-19 DIAGNOSIS — Z86.006 HISTORY OF MELANOMA IN SITU: ICD-10-CM

## 2021-01-19 PROCEDURE — G0439 PPPS, SUBSEQ VISIT: HCPCS | Performed by: INTERNAL MEDICINE

## 2021-01-19 PROCEDURE — 99213 OFFICE O/P EST LOW 20 MIN: CPT | Performed by: INTERNAL MEDICINE

## 2021-01-19 NOTE — PATIENT INSTRUCTIONS
Your physical today was normal.  Please get blood tests done soon when you can. You will be due for colonoscopy later this year. Please get 2 doses of Shingrix vaccine at your pharmacy. Continue healthy diet and regular walking.   Annual Medicare physica the following criteria:   • Men who are 73-68 years old and have smoked more than 100 cigarettes in their lifetime   • Anyone with a family history    Colorectal Cancer Screening Covered up to Age 76     Colonoscopy Screen   Covered every 10 years- more of mentally retarded   Persons who live in the same house as a HepB virus carrier   Homosexual men   Illicit injectable drug abusers     Tetanus Toxoid- Only covered with a cut with metal- TD and TDaP Not covered by Medicare Part B) No orders found for this o

## 2021-01-19 NOTE — PROGRESS NOTES
HPI:   Mikala Douglas is an [de-identified]year old male who presents this afternoon for a Medicare Subsequent Annual Wellness visit (Pt already had Initial Annual Wellness). His last Medicare physical was September 2019. He feels well.   No specific issues for dis in [address] He smoked tobacco in the past but quit greater than 12 months ago.   Social History    Tobacco Use      Smoking status: Former Smoker        Packs/day: 1.00        Years: 36.00        Pack years: 36        Types: Cigarettes        Quit date: 7/1 penicillins. CURRENT MEDICATIONS:     •  MULTIVITAMIN TAB/CAP, Take 1 tablet by mouth daily.        MEDICAL INFORMATION:   He  has a past medical history of Aortic atherosclerosis ([address] Stubbs esophagus (September 2016), BCC (basal cell carcinoma of sk Occasional neck pain. Occasional shoulder pain.   No leg swelling  NEURO: No headaches        EXAM:   /82   Pulse 67   Ht 5' 5\" (1.651 m)   Wt 157 lb 8 oz (71.4 kg)   BMI 26.21 kg/m²   Estimated body mass index is 26.21 kg/m² as calculated from the percussion, few right basilar inspiratory crackles (chronic per patient), otherwise clear without wheezing  CARDIAC: Rhythm regular S1 S2 normal without murmur or edema  ABDOMEN: Bowel sounds normal soft nontender without mass or hepatosplenomegaly  EXTREM labs         Diet assessment: good     PLAN:  The patient indicates understanding of these issues and agrees to the plan. Reinforced healthy diet, lifestyle, and exercise. No follow-ups on file.      Kimberly Quevedo MD, 1/19/2021     General Health     I 10/2/2020   Please get every year    Pneumococcal 13 (Prevnar)  Covered Once after 65 06/22/2016 Please get once after your 65th birthday    Pneumococcal 23 (Pneumovax)  Covered Once after 65 No vaccine history found Please get once after your Debara Numbers

## 2021-01-22 NOTE — TELEPHONE ENCOUNTER
Scheduled for:  Colonoscopy - [id number]  Provider Name:  Dr. Emi Zaidi  Date:  11/11/21  Location:  [facility]  Sedation:  MAC  Time:  11:15 am (pt is aware to arrive at 10:15 am)  Prep:  Suprep, Prep instructions were given to pt over the phone, pt verbalized understand

## 2021-01-28 ENCOUNTER — LAB ENCOUNTER (OUTPATIENT)
Dept: LAB | Facility: HOSPITAL | Age: 81
End: 2021-01-28
Attending: INTERNAL MEDICINE
Payer: MEDICARE

## 2021-01-28 DIAGNOSIS — E78.5 DYSLIPIDEMIA: ICD-10-CM

## 2021-01-28 DIAGNOSIS — D50.8 OTHER IRON DEFICIENCY ANEMIA: ICD-10-CM

## 2021-01-28 LAB
ALBUMIN SERPL-MCNC: 4 G/DL (ref 3.4–5)
ALBUMIN/GLOB SERPL: 1.1 {RATIO} (ref 1–2)
ALP LIVER SERPL-CCNC: 87 U/L
ALT SERPL-CCNC: 24 U/L
ANION GAP SERPL CALC-SCNC: 4 MMOL/L (ref 0–18)
AST SERPL-CCNC: 21 U/L (ref 15–37)
BILIRUB SERPL-MCNC: 0.6 MG/DL (ref 0.1–2)
BUN BLD-MCNC: 19 MG/DL (ref 7–18)
BUN/CREAT SERPL: 14.7 (ref 10–20)
CALCIUM BLD-MCNC: 9.4 MG/DL (ref 8.5–10.1)
CHLORIDE SERPL-SCNC: 107 MMOL/L (ref 98–112)
CHOLEST SMN-MCNC: 198 MG/DL (ref ?–200)
CO2 SERPL-SCNC: 29 MMOL/L (ref 21–32)
CREAT BLD-MCNC: 1.29 MG/DL
DEPRECATED RDW RBC AUTO: 44.8 FL (ref 35.1–46.3)
ERYTHROCYTE [DISTWIDTH] IN BLOOD BY AUTOMATED COUNT: 13.2 % (ref 11–15)
GLOBULIN PLAS-MCNC: 3.7 G/DL (ref 2.8–4.4)
GLUCOSE BLD-MCNC: 108 MG/DL (ref 70–99)
HCT VFR BLD AUTO: 47.2 %
HDLC SERPL-MCNC: 43 MG/DL (ref 40–59)
HGB BLD-MCNC: 15.3 G/DL
LDLC SERPL CALC-MCNC: 124 MG/DL (ref ?–100)
M PROTEIN MFR SERPL ELPH: 7.7 G/DL (ref 6.4–8.2)
MCH RBC QN AUTO: 29.8 PG (ref 26–34)
MCHC RBC AUTO-ENTMCNC: 32.4 G/DL (ref 31–37)
MCV RBC AUTO: 91.8 FL
NONHDLC SERPL-MCNC: 155 MG/DL (ref ?–130)
OSMOLALITY SERPL CALC.SUM OF ELEC: 293 MOSM/KG (ref 275–295)
PATIENT FASTING Y/N/NP: YES
PATIENT FASTING Y/N/NP: YES
PLATELET # BLD AUTO: 255 10(3)UL (ref 150–450)
POTASSIUM SERPL-SCNC: 4.2 MMOL/L (ref 3.5–5.1)
RBC # BLD AUTO: 5.14 X10(6)UL
SODIUM SERPL-SCNC: 140 MMOL/L (ref 136–145)
TRIGL SERPL-MCNC: 153 MG/DL (ref 30–149)
VLDLC SERPL CALC-MCNC: 31 MG/DL (ref 0–30)
WBC # BLD AUTO: 7.1 X10(3) UL (ref 4–11)

## 2021-01-28 PROCEDURE — 85027 COMPLETE CBC AUTOMATED: CPT

## 2021-01-28 PROCEDURE — 80061 LIPID PANEL: CPT

## 2021-01-28 PROCEDURE — 80053 COMPREHEN METABOLIC PANEL: CPT

## 2021-01-28 PROCEDURE — 36415 COLL VENOUS BLD VENIPUNCTURE: CPT

## 2021-03-05 DIAGNOSIS — Z23 NEED FOR VACCINATION: ICD-10-CM

## 2021-03-31 ENCOUNTER — PATIENT MESSAGE (OUTPATIENT)
Dept: INTERNAL MEDICINE CLINIC | Facility: CLINIC | Age: 81
End: 2021-03-31

## 2021-03-31 NOTE — TELEPHONE ENCOUNTER
From: Phoenix Franz  To: Mauro Simmons MD  Sent: 3/31/2021 1:37 PM CDT  Subject: Other    I have received COVID-19 vaccination (two dose-Pfisrer).  First injection was on March 26,2021 and second dose was on March 26, 2021; administered by [facility] as pa

## 2021-05-27 ENCOUNTER — HOSPITAL ENCOUNTER (EMERGENCY)
Facility: HOSPITAL | Age: 81
Discharge: HOME | End: 2021-05-27
Attending: EMERGENCY MEDICINE
Payer: COMMERCIAL

## 2021-05-27 VITALS
RESPIRATION RATE: 20 BRPM | TEMPERATURE: 97 F | OXYGEN SATURATION: 98 % | HEART RATE: 65 BPM | DIASTOLIC BLOOD PRESSURE: 84 MMHG | SYSTOLIC BLOOD PRESSURE: 158 MMHG

## 2021-05-27 DIAGNOSIS — L24.5 IRRITANT CONTACT DERMATITIS DUE TO OTHER CHEMICAL PRODUCTS: ICD-10-CM
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
## 2021-05-27 PROCEDURE — 99283 EMERGENCY DEPT VISIT LOW MDM: CPT
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
## 2021-11-08 ENCOUNTER — LAB ENCOUNTER (OUTPATIENT)
Dept: LAB | Facility: HOSPITAL | Age: 81
End: 2021-11-08
Attending: INTERNAL MEDICINE
Payer: MEDICARE

## 2021-11-08 DIAGNOSIS — Z01.818 PRE-OP TESTING: ICD-10-CM

## 2021-11-09 ENCOUNTER — PATIENT MESSAGE (OUTPATIENT)
Dept: GASTROENTEROLOGY | Facility: CLINIC | Age: 81
End: 2021-11-09

## 2021-11-09 NOTE — TELEPHONE ENCOUNTER
From: Charlotte Duenas  To: Melvi Og. Edda Esteban MD  Sent: 11/9/2021 11:08 AM CST  Subject: Preparing for Nov.11 procedure    I will report to [facility] at 10:15AM on Nov.11th.   I intend to start bowel prep at 5:00PM on Nov. 10th for first dose and then

## 2021-11-11 ENCOUNTER — HOSPITAL ENCOUNTER (OUTPATIENT)
Facility: HOSPITAL | Age: 81
Setting detail: HOSPITAL OUTPATIENT SURGERY
Discharge: HOME | End: 2021-11-11
Attending: INTERNAL MEDICINE | Admitting: INTERNAL MEDICINE
Payer: MEDICARE

## 2021-11-11 ENCOUNTER — ANESTHESIA (OUTPATIENT)
Dept: ENDOSCOPY | Facility: HOSPITAL | Age: 81
End: 2021-11-11
Payer: MEDICARE

## 2021-11-11 ENCOUNTER — ANESTHESIA EVENT (OUTPATIENT)
Dept: ENDOSCOPY | Facility: HOSPITAL | Age: 81
End: 2021-11-11
Payer: MEDICARE

## 2021-11-11 VITALS
RESPIRATION RATE: 21 BRPM | OXYGEN SATURATION: 97 % | HEART RATE: 70 BPM | WEIGHT: 155 LBS | SYSTOLIC BLOOD PRESSURE: 124 MMHG | BODY MASS INDEX: 24.91 KG/M2 | DIASTOLIC BLOOD PRESSURE: 72 MMHG | TEMPERATURE: 97 F | HEIGHT: 66 IN

## 2021-11-11 DIAGNOSIS — Z86.010 PERSONAL HISTORY OF COLONIC POLYPS: ICD-10-CM

## 2021-11-11 DIAGNOSIS — Z01.818 PRE-OP TESTING: Primary | ICD-10-CM

## 2021-11-11 PROCEDURE — 0DBP8ZX EXCISION OF RECTUM, VIA NATURAL OR ARTIFICIAL OPENING ENDOSCOPIC, DIAGNOSTIC: ICD-10-PCS | Performed by: INTERNAL MEDICINE

## 2021-11-11 PROCEDURE — 0DBL8ZX EXCISION OF TRANSVERSE COLON, VIA NATURAL OR ARTIFICIAL OPENING ENDOSCOPIC, DIAGNOSTIC: ICD-10-PCS | Performed by: INTERNAL MEDICINE

## 2021-11-11 PROCEDURE — 45380 COLONOSCOPY AND BIOPSY: CPT | Performed by: INTERNAL MEDICINE

## 2021-11-11 RX ORDER — SODIUM CHLORIDE, SODIUM LACTATE, POTASSIUM CHLORIDE, CALCIUM CHLORIDE 600; 310; 30; 20 MG/100ML; MG/100ML; MG/100ML; MG/100ML
INJECTION, SOLUTION INTRAVENOUS CONTINUOUS
Status: DISCONTINUED | OUTPATIENT
Start: 2021-11-11 | End: 2021-11-11

## 2021-11-11 RX ORDER — NALOXONE HYDROCHLORIDE 0.4 MG/ML
80 INJECTION, SOLUTION INTRAMUSCULAR; INTRAVENOUS; SUBCUTANEOUS AS NEEDED
Status: DISCONTINUED | OUTPATIENT
Start: 2021-11-11 | End: 2021-11-11

## 2021-11-11 RX ADMIN — SODIUM CHLORIDE, SODIUM LACTATE, POTASSIUM CHLORIDE, CALCIUM CHLORIDE: 600; 310; 30; 20 INJECTION, SOLUTION INTRAVENOUS at 12:09:00

## 2021-11-11 NOTE — ANESTHESIA POSTPROCEDURE EVALUATION
Patient: Alma Rosa Boudreaux    Procedure Summary     Date: 11/11/21 Room / Location: [address] [place] [address] ENDOSCOPY 04 / [address] [place] [address] ENDOSCOPY    Anesthesia Start: 1746 Anesthesia Stop: 4271    Procedure: COLONOSCOPY (N/A ) Diagnosis:       Personal history of colonic polyps      (Col

## 2021-11-11 NOTE — ANESTHESIA PREPROCEDURE EVALUATION
Anesthesia PreOp Note    HPI:     Kannan Chacon is a [de-identified]year old male who presents for preoperative consultation requested by: Kingsley Doyle MD    Date of Surgery: 11/11/2021    Procedure(s):  COLONOSCOPY  Indication: Personal history of colonic polyp disease, stage 3 ([facility] [address]    • Diverticulosis of large intestine    • Dyslipidemia    • GERD (gastroesophageal reflux disease)    • Iron deficiency anemia June 2016   • Melanoma in situ of [facility]) July 2014   • Paraesophageal hiatal hernia    • Pneumonia Packs/day: 1.00        Years: 36.00        Pack years: 39        Types: Cigarettes        Quit date: 1992        Years since quittin.3      Smokeless tobacco: Never Used    Vaping Use      Vaping Use: Never used    Substance and Sexual Activity reviewed and Nursing notes reviewed    No history of anesthetic complications   Airway   Mallampati: II  TM distance: >3 FB  Neck ROM: full  Dental      Comment: A few implants    Pulmonary - normal exam   Cardiovascular - normal exam  Exercise tolerance:

## 2021-11-11 NOTE — OPERATIVE REPORT
[facility] Endoscopy Report      Preoperative Diagnosis:  - history of malignant polyp      Postoperative Diagnosis:  - colon polyps x 3  - anastomosis right colon   - pan-diverticulosis  - internal hermorrhoids      Procedure:    Colonosco

## 2021-11-11 NOTE — H&P
History & Physical Examination    Patient Name: Pedro Deluna  MRN: [id number]  CSN: [id number]  YOB: 1940    Diagnosis:  History of malignant colon polyp  History of colon polyps        MULTIVITAMIN TAB/CAP, Take 1 tablet by mouth daily. , D Cancer Mother 79        Metastatic to brain   • Breast Cancer Sister 48   • Hypertension Brother    • Diabetes Brother    • Other (COPD) Father    • Other (COPD) Sister      Social History    Tobacco Use      Smoking status: Former Smoker        Packs/day:

## 2021-11-12 ENCOUNTER — TELEPHONE (OUTPATIENT)
Dept: GASTROENTEROLOGY | Facility: CLINIC | Age: 81
End: 2021-11-12

## 2021-11-12 NOTE — TELEPHONE ENCOUNTER
----- Message from Malou Alan MD sent at 11/11/2021  5:55 PM CST -----  I wanted to get back to you with your colonoscopy results. You had 3 colon polyps removed which were benign. No recall colonoscopy.      You also have internal hemorrhoids and di

## 2021-11-12 NOTE — TELEPHONE ENCOUNTER
I updated Health Maintenance that colonoscopy completed with no recall.     Patient viewed below result note in MyChart:  Seen by patient Mikala Douglas on 11/12/2021  7:54 AM

## 2022-01-12 ENCOUNTER — OFFICE VISIT (OUTPATIENT)
Dept: DERMATOLOGY CLINIC | Facility: CLINIC | Age: 82
End: 2022-01-12
Payer: MEDICARE

## 2022-01-12 DIAGNOSIS — D23.4 BENIGN NEOPLASM OF SCALP AND SKIN OF NECK: ICD-10-CM

## 2022-01-12 DIAGNOSIS — L82.1 SEBORRHEIC KERATOSES: ICD-10-CM

## 2022-01-12 DIAGNOSIS — L57.8 SUN-DAMAGED SKIN: ICD-10-CM

## 2022-01-12 DIAGNOSIS — D23.30 BENIGN NEOPLASM OF SKIN OF FACE: ICD-10-CM

## 2022-01-12 DIAGNOSIS — D23.5 BENIGN NEOPLASM OF SKIN OF TRUNK, EXCEPT SCROTUM: ICD-10-CM

## 2022-01-12 DIAGNOSIS — L57.0 ACTINIC KERATOSIS: Primary | ICD-10-CM

## 2022-01-12 DIAGNOSIS — Z85.828 HISTORY OF BASAL CELL CARCINOMA: ICD-10-CM

## 2022-01-12 DIAGNOSIS — D23.60 BENIGN NEOPLASM OF SKIN OF UPPER EXTREMITY AND SHOULDER, UNSPECIFIED LATERALITY: ICD-10-CM

## 2022-01-12 DIAGNOSIS — Z86.006 HISTORY OF MELANOMA IN SITU: ICD-10-CM

## 2022-01-12 PROCEDURE — 17000 DESTRUCT PREMALG LESION: CPT | Performed by: DERMATOLOGY

## 2022-01-12 PROCEDURE — 17003 DESTRUCT PREMALG LES 2-14: CPT | Performed by: DERMATOLOGY

## 2022-01-12 PROCEDURE — 99213 OFFICE O/P EST LOW 20 MIN: CPT | Performed by: DERMATOLOGY

## 2022-01-13 RX ORDER — FLUOROURACIL 50 MG/G
CREAM TOPICAL
Qty: 40 G | Refills: 1 | Status: SHIPPED | OUTPATIENT
Start: 2022-01-13

## 2022-01-21 ENCOUNTER — OFFICE VISIT (OUTPATIENT)
Dept: INTERNAL MEDICINE CLINIC | Facility: CLINIC | Age: 82
End: 2022-01-21
Payer: MEDICARE

## 2022-01-21 VITALS
BODY MASS INDEX: 24.83 KG/M2 | DIASTOLIC BLOOD PRESSURE: 64 MMHG | HEIGHT: 66 IN | WEIGHT: 154.5 LBS | SYSTOLIC BLOOD PRESSURE: 134 MMHG | HEART RATE: 81 BPM

## 2022-01-21 DIAGNOSIS — Z00.00 ENCOUNTER FOR ANNUAL HEALTH EXAMINATION: ICD-10-CM

## 2022-01-21 DIAGNOSIS — D50.8 OTHER IRON DEFICIENCY ANEMIA: ICD-10-CM

## 2022-01-21 DIAGNOSIS — E78.5 DYSLIPIDEMIA: ICD-10-CM

## 2022-01-21 DIAGNOSIS — N18.30 STAGE 3 CHRONIC KIDNEY DISEASE, UNSPECIFIED WHETHER STAGE 3A OR 3B CKD (HCC): ICD-10-CM

## 2022-01-21 DIAGNOSIS — I70.0 AORTIC ATHEROSCLEROSIS (HCC): ICD-10-CM

## 2022-01-21 DIAGNOSIS — K22.70 BARRETT'S ESOPHAGUS WITHOUT DYSPLASIA: ICD-10-CM

## 2022-01-21 DIAGNOSIS — Z86.006 HISTORY OF MELANOMA IN SITU: ICD-10-CM

## 2022-01-21 DIAGNOSIS — Z00.00 ANNUAL PHYSICAL EXAM: Primary | ICD-10-CM

## 2022-01-21 DIAGNOSIS — D01.0 CARCINOMA IN SITU OF COLON: ICD-10-CM

## 2022-01-21 DIAGNOSIS — D12.6 ADENOMATOUS POLYP OF COLON, UNSPECIFIED PART OF COLON: ICD-10-CM

## 2022-01-21 PROCEDURE — 90732 PPSV23 VACC 2 YRS+ SUBQ/IM: CPT | Performed by: INTERNAL MEDICINE

## 2022-01-21 PROCEDURE — G0439 PPPS, SUBSEQ VISIT: HCPCS | Performed by: INTERNAL MEDICINE

## 2022-01-21 PROCEDURE — 99213 OFFICE O/P EST LOW 20 MIN: CPT | Performed by: INTERNAL MEDICINE

## 2022-01-21 PROCEDURE — G0009 ADMIN PNEUMOCOCCAL VACCINE: HCPCS | Performed by: INTERNAL MEDICINE

## 2022-01-21 NOTE — PROGRESS NOTES
Subjective:   Tashi Martin is an 80year old male who presents this morning for a Medicare Subsequent Annual Wellness visit (Pt already had Initial Annual Wellness). His last Medicare physical was January 2021. He feels well.   No specific issues for copy in EMR. Discussed Advanced Care Planning with patient and instructed patient to get our office a copy to be scanned into EMR.       Patient Active Problem List:     Melanoma in situ of cheek ([facility] [address]     GERD (gastroesophageal reflux disease)     Hypercho (age of onset: 48) in his sister; Breast Cancer (age of onset: 79) in his mother; COPD in his father and sister; Diabetes in his brother; Hypertension in his brother. Social History:  He  reports that he quit smoking about 29 years ago.  His smoking use in without cyanosis or clubbing  PULSES: Carotid upstrokes 2+ without carotid bruits, distal pulses 2+ bilateral dorsalis pedis and posterior tibial  NEURO: Reflexes 2-3+ bilaterally and symmetric     /64   Pulse 81   Ht 5' 6\" (1.676 m)   Wt 154 lb 8 o sent.  Continue healthy diet and regular physical activity, maintaining current body weight. Annual Medicare physical.  -     Comp Metabolic Panel (14); Future; Expected date: 01/21/2022  -     CBC, Platelet;  No Differential; Future; Expected date: 01/21/ of 0 to 10, with 0 being no pain and 10 being severe pain, what is your pain level?: 6 - (Moderate)  In the past six months, have you experienced urine leakage?: 0-No  At any time do you feel concerned for the safety/well-being of yourself and/or your chil 11/11/2021    No recommendations at this time    Flexible Sigmoidoscopy   Covered every 4 years -    Fecal Occult Blood Test Covered annually -   Prostate Cancer Screening    Prostate-Specific Antigen (PSA) Annually No results found for: PSA  There are no

## 2022-01-21 NOTE — PATIENT INSTRUCTIONS
Your physical today was normal.  You received a Pneumovax booster today. Please get 2 doses of Shingrix vaccine at your pharmacy. Come in soon for blood tests when you can. Continue healthy diet and regular activity.   Annual Medicare physical.  Candido John Prostate-Specific Antigen (PSA) Annually No results found for: PSA  There are no preventive care reminders to display for this patient.    Immunizations    Influenza Covered once per flu season  Please get every year 09/09/2021  No recommendations at this t

## 2022-01-24 NOTE — PROGRESS NOTES
Bibi Patel is a 80year old male. HPI:     CC:  Patient presents with:  Upper Body Exam: LOV 8/25/20. \" LSS\" patient, with Hx of AK's, BCC and Melanoma.  Patient present for Upper Body exam. Patient has c/o with flakey scalp for 6 months, and current Smoking status: Former Smoker        Packs/day: 1.00        Years: 36.00        Pack years: 36        Types: Cigarettes        Quit date: 1992        Years since quittin.5      Smokeless tobacco: Never Used    Vaping Use      Vaping Use: Never us and BCCa scalp   • TONSILLECTOMY  1948     Social History    Socioeconomic History      Marital status:       Spouse name: Not on file      Number of children: 1      Years of education: Not on file      Highest education level: Not on file    Occup Hypertension Brother    • Diabetes Brother    • Other (COPD) Father    • Other (COPD) Sister        There were no vitals filed for this visit. HPI:  Patient presents with:  Upper Body Exam: LOV 8/25/20.  \" LSS\" patient, with Hx of AK's, BCC and Melanom this Visit:  Requested Prescriptions     Signed Prescriptions Disp Refills   • fluorouracil 5 % External Cream 40 g 1     Sig: Use twice weekly at night as directed x 6 weeks         Actinic keratosis  (primary encounter diagnosis)  History of melanoma in length. Benign nevi, seborrheic  keratoses, cherry angiomas:  Reassurance regarding other benign skin lesions. Signs and symptoms of skin cancer, ABCDE's of melanoma discussed with patient. Sunscreen use, sun protection, self exams reviewed.   Followup as

## 2022-01-28 ENCOUNTER — LAB ENCOUNTER (OUTPATIENT)
Dept: LAB | Facility: HOSPITAL | Age: 82
End: 2022-01-28
Attending: INTERNAL MEDICINE
Payer: MEDICARE

## 2022-01-28 DIAGNOSIS — N18.30 STAGE 3 CHRONIC KIDNEY DISEASE, UNSPECIFIED WHETHER STAGE 3A OR 3B CKD (HCC): ICD-10-CM

## 2022-01-28 DIAGNOSIS — E78.5 DYSLIPIDEMIA: ICD-10-CM

## 2022-01-28 DIAGNOSIS — Z00.00 ANNUAL PHYSICAL EXAM: ICD-10-CM

## 2022-01-28 LAB
ALBUMIN SERPL-MCNC: 3.9 G/DL (ref 3.4–5)
ALBUMIN/GLOB SERPL: 1.2 {RATIO} (ref 1–2)
ALP LIVER SERPL-CCNC: 92 U/L
ANION GAP SERPL CALC-SCNC: 3 MMOL/L (ref 0–18)
AST SERPL-CCNC: 21 U/L (ref 15–37)
BILIRUB SERPL-MCNC: 0.6 MG/DL (ref 0.1–2)
BUN BLD-MCNC: 21 MG/DL (ref 7–18)
BUN/CREAT SERPL: 16.3 (ref 10–20)
CALCIUM BLD-MCNC: 10 MG/DL (ref 8.5–10.1)
CHLORIDE SERPL-SCNC: 105 MMOL/L (ref 98–112)
CHOLEST SERPL-MCNC: 200 MG/DL (ref ?–200)
CO2 SERPL-SCNC: 31 MMOL/L (ref 21–32)
CREAT BLD-MCNC: 1.29 MG/DL
DEPRECATED RDW RBC AUTO: 44.2 FL (ref 35.1–46.3)
ERYTHROCYTE [DISTWIDTH] IN BLOOD BY AUTOMATED COUNT: 12.9 % (ref 11–15)
FASTING PATIENT LIPID ANSWER: YES
FASTING STATUS PATIENT QL REPORTED: YES
GLOBULIN PLAS-MCNC: 3.2 G/DL (ref 2.8–4.4)
GLUCOSE BLD-MCNC: 124 MG/DL (ref 70–99)
HCT VFR BLD AUTO: 48.3 %
HDLC SERPL-MCNC: 38 MG/DL (ref 40–59)
HGB BLD-MCNC: 15.2 G/DL
MCH RBC QN AUTO: 29.4 PG (ref 26–34)
MCHC RBC AUTO-ENTMCNC: 31.5 G/DL (ref 31–37)
MCV RBC AUTO: 93.4 FL
NONHDLC SERPL-MCNC: 162 MG/DL (ref ?–130)
OSMOLALITY SERPL CALC.SUM OF ELEC: 292 MOSM/KG (ref 275–295)
PLATELET # BLD AUTO: 249 10(3)UL (ref 150–450)
POTASSIUM SERPL-SCNC: 4.8 MMOL/L (ref 3.5–5.1)
PROT SERPL-MCNC: 7.1 G/DL (ref 6.4–8.2)
RBC # BLD AUTO: 5.17 X10(6)UL
SODIUM SERPL-SCNC: 139 MMOL/L (ref 136–145)
TRIGL SERPL-MCNC: 179 MG/DL (ref 30–149)
VLDLC SERPL CALC-MCNC: 32 MG/DL (ref 0–30)
WBC # BLD AUTO: 8.2 X10(3) UL (ref 4–11)

## 2022-01-28 PROCEDURE — 80053 COMPREHEN METABOLIC PANEL: CPT

## 2022-01-28 PROCEDURE — 80061 LIPID PANEL: CPT

## 2022-01-28 PROCEDURE — 36415 COLL VENOUS BLD VENIPUNCTURE: CPT

## 2022-01-28 PROCEDURE — 85027 COMPLETE CBC AUTOMATED: CPT

## 2022-03-03 ENCOUNTER — APPOINTMENT (OUTPATIENT)
Dept: GENERAL RADIOLOGY | Facility: HOSPITAL | Age: 82
End: 2022-03-03
Payer: MEDICARE

## 2022-03-03 ENCOUNTER — NURSE TRIAGE (OUTPATIENT)
Dept: INTERNAL MEDICINE CLINIC | Facility: CLINIC | Age: 82
End: 2022-03-03

## 2022-03-03 ENCOUNTER — APPOINTMENT (OUTPATIENT)
Dept: GENERAL RADIOLOGY | Age: 82
End: 2022-03-03
Payer: MEDICARE

## 2022-03-03 ENCOUNTER — HOSPITAL ENCOUNTER (EMERGENCY)
Facility: HOSPITAL | Age: 82
Discharge: HOME | End: 2022-03-03
Attending: EMERGENCY MEDICINE
Payer: MEDICARE

## 2022-03-03 ENCOUNTER — HOSPITAL ENCOUNTER (OUTPATIENT)
Age: 82
Discharge: ANOTHER HOSPITAL | End: 2022-03-03
Payer: MEDICARE

## 2022-03-03 VITALS
HEART RATE: 94 BPM | WEIGHT: 148 LBS | SYSTOLIC BLOOD PRESSURE: 133 MMHG | HEIGHT: 67 IN | BODY MASS INDEX: 23.23 KG/M2 | RESPIRATION RATE: 18 BRPM | TEMPERATURE: 97 F | DIASTOLIC BLOOD PRESSURE: 68 MMHG | OXYGEN SATURATION: 94 %

## 2022-03-03 VITALS
DIASTOLIC BLOOD PRESSURE: 57 MMHG | SYSTOLIC BLOOD PRESSURE: 140 MMHG | OXYGEN SATURATION: 96 % | RESPIRATION RATE: 23 BRPM | HEART RATE: 66 BPM | WEIGHT: 148 LBS | BODY MASS INDEX: 23 KG/M2 | TEMPERATURE: 97 F

## 2022-03-03 DIAGNOSIS — R09.89 BIBASILAR CRACKLES: ICD-10-CM

## 2022-03-03 DIAGNOSIS — J84.9 INTERSTITIAL LUNG DISEASE (HCC): Primary | ICD-10-CM

## 2022-03-03 DIAGNOSIS — J18.9 COMMUNITY ACQUIRED PNEUMONIA, UNSPECIFIED LATERALITY: ICD-10-CM

## 2022-03-03 DIAGNOSIS — R94.31 ABNORMAL EKG: ICD-10-CM

## 2022-03-03 DIAGNOSIS — R05.9 COUGH: Primary | ICD-10-CM

## 2022-03-03 LAB
ANION GAP SERPL CALC-SCNC: 5 MMOL/L (ref 0–18)
BASOPHILS # BLD AUTO: 0.05 X10(3) UL (ref 0–0.2)
BASOPHILS NFR BLD AUTO: 0.5 %
BUN BLD-MCNC: 21 MG/DL (ref 7–18)
BUN/CREAT SERPL: 13 (ref 10–20)
CALCIUM BLD-MCNC: 10.1 MG/DL (ref 8.5–10.1)
CHLORIDE SERPL-SCNC: 104 MMOL/L (ref 98–112)
CO2 SERPL-SCNC: 28 MMOL/L (ref 21–32)
CREAT BLD-MCNC: 1.61 MG/DL
D DIMER PPP FEU-MCNC: 0.78 UG/ML FEU (ref ?–0.81)
DEPRECATED RDW RBC AUTO: 43 FL (ref 35.1–46.3)
EOSINOPHIL # BLD AUTO: 0.16 X10(3) UL (ref 0–0.7)
EOSINOPHIL NFR BLD AUTO: 1.7 %
ERYTHROCYTE [DISTWIDTH] IN BLOOD BY AUTOMATED COUNT: 12.8 % (ref 11–15)
HCT VFR BLD AUTO: 46.9 %
HGB BLD-MCNC: 15.3 G/DL
IMM GRANULOCYTES # BLD AUTO: 0.03 X10(3) UL (ref 0–1)
IMM GRANULOCYTES NFR BLD: 0.3 %
LYMPHOCYTES # BLD AUTO: 1.07 X10(3) UL (ref 1–4)
LYMPHOCYTES NFR BLD AUTO: 11.6 %
MCH RBC QN AUTO: 29.8 PG (ref 26–34)
MCHC RBC AUTO-ENTMCNC: 32.6 G/DL (ref 31–37)
MCV RBC AUTO: 91.4 FL
MONOCYTES # BLD AUTO: 0.83 X10(3) UL (ref 0.1–1)
MONOCYTES NFR BLD AUTO: 9 %
NEUTROPHILS # BLD AUTO: 7.12 X10 (3) UL (ref 1.5–7.7)
NEUTROPHILS # BLD AUTO: 7.12 X10(3) UL (ref 1.5–7.7)
NEUTROPHILS NFR BLD AUTO: 76.9 %
NT-PROBNP SERPL-MCNC: 362 PG/ML (ref ?–450)
OSMOLALITY SERPL CALC.SUM OF ELEC: 287 MOSM/KG (ref 275–295)
PLATELET # BLD AUTO: 356 10(3)UL (ref 150–450)
POTASSIUM SERPL-SCNC: 3.9 MMOL/L (ref 3.5–5.1)
PROCALCITONIN SERPL-MCNC: 0.04 NG/ML (ref ?–0.16)
RBC # BLD AUTO: 5.13 X10(6)UL
SARS-COV-2 RNA RESP QL NAA+PROBE: NOT DETECTED
SARS-COV-2 RNA RESP QL NAA+PROBE: NOT DETECTED
SODIUM SERPL-SCNC: 137 MMOL/L (ref 136–145)
WBC # BLD AUTO: 9.3 X10(3) UL (ref 4–11)

## 2022-03-03 PROCEDURE — 85025 COMPLETE CBC W/AUTO DIFF WBC: CPT

## 2022-03-03 PROCEDURE — 36415 COLL VENOUS BLD VENIPUNCTURE: CPT

## 2022-03-03 PROCEDURE — 71045 X-RAY EXAM CHEST 1 VIEW: CPT | Performed by: EMERGENCY MEDICINE

## 2022-03-03 PROCEDURE — 85025 COMPLETE CBC W/AUTO DIFF WBC: CPT | Performed by: EMERGENCY MEDICINE

## 2022-03-03 PROCEDURE — 85379 FIBRIN DEGRADATION QUANT: CPT | Performed by: EMERGENCY MEDICINE

## 2022-03-03 PROCEDURE — 83880 ASSAY OF NATRIURETIC PEPTIDE: CPT | Performed by: EMERGENCY MEDICINE

## 2022-03-03 PROCEDURE — 99203 OFFICE O/P NEW LOW 30 MIN: CPT

## 2022-03-03 PROCEDURE — 84484 ASSAY OF TROPONIN QUANT: CPT | Performed by: EMERGENCY MEDICINE

## 2022-03-03 PROCEDURE — 99284 EMERGENCY DEPT VISIT MOD MDM: CPT

## 2022-03-03 PROCEDURE — U0002 COVID-19 LAB TEST NON-CDC: HCPCS

## 2022-03-03 PROCEDURE — 84484 ASSAY OF TROPONIN QUANT: CPT

## 2022-03-03 PROCEDURE — 93000 ELECTROCARDIOGRAM COMPLETE: CPT

## 2022-03-03 PROCEDURE — 80048 BASIC METABOLIC PNL TOTAL CA: CPT

## 2022-03-03 PROCEDURE — 84145 PROCALCITONIN (PCT): CPT | Performed by: EMERGENCY MEDICINE

## 2022-03-03 PROCEDURE — 80048 BASIC METABOLIC PNL TOTAL CA: CPT | Performed by: EMERGENCY MEDICINE

## 2022-03-03 RX ORDER — LEVOFLOXACIN 750 MG/1
750 TABLET ORAL DAILY
Qty: 7 TABLET | Refills: 0 | Status: SHIPPED | OUTPATIENT
Start: 2022-03-03 | End: 2022-03-10

## 2022-03-03 NOTE — ED QUICK NOTES
Pt to ED  From IC with c/o of SOB. Pt states chronic crackles to lungs bilaterally. Pt denies CP or recent fever. Pt states intermittent dry cough at night. Pt A&Ox4. Dysapnea at rest noted. Pt updated on POC. Pt placed on cardiac monitor.

## 2022-03-03 NOTE — ED INITIAL ASSESSMENT (HPI)
CONCEPCIÓN Cao at the bedside assessing. Patient here with complaints of SOB with exertion/walking for the last 2 weeks. At rest has no SOB. Denies any CP, feet swelling, fever, N/V, abdominal pain, or other symptoms. Quit smoking 41 years ago. He has had a cough that is present with first lying down at night or reclining and at random. States sometimes it is dry and other times it is wet. Denies any calf pain or any history of blood clots.

## 2022-03-03 NOTE — ED INITIAL ASSESSMENT (HPI)
Patient complains of shortness of breath x 2 weeks, associated with dry cough and worse with exertion. Seen at urgent care this am and told to come to ED per ekg showed new abnormalities. Denies chest pain.

## 2022-03-04 ENCOUNTER — TELEPHONE (OUTPATIENT)
Dept: PULMONOLOGY | Facility: CLINIC | Age: 82
End: 2022-03-04

## 2022-03-04 NOTE — TELEPHONE ENCOUNTER
----- Message from Sarina Adam MD sent at 3/3/2022  5:31 PM CST -----  This pt was seen in ER.  He is being d/ c to home and should f/u with me in 1-2 weeks  Thx

## 2022-03-04 NOTE — TELEPHONE ENCOUNTER
you are booked out to until late April I can double book patient if agreeable what day and time you prefer to double book that patient.

## 2022-03-07 ENCOUNTER — OFFICE VISIT (OUTPATIENT)
Dept: INTERNAL MEDICINE CLINIC | Facility: CLINIC | Age: 82
End: 2022-03-07
Payer: MEDICARE

## 2022-03-07 VITALS
HEIGHT: 66 IN | HEART RATE: 91 BPM | BODY MASS INDEX: 23.25 KG/M2 | WEIGHT: 144.69 LBS | SYSTOLIC BLOOD PRESSURE: 134 MMHG | OXYGEN SATURATION: 99 % | DIASTOLIC BLOOD PRESSURE: 66 MMHG

## 2022-03-07 DIAGNOSIS — R06.00 EXERTIONAL DYSPNEA: Primary | ICD-10-CM

## 2022-03-07 PROCEDURE — 99213 OFFICE O/P EST LOW 20 MIN: CPT | Performed by: INTERNAL MEDICINE

## 2022-03-10 ENCOUNTER — TELEPHONE (OUTPATIENT)
Dept: PULMONOLOGY | Facility: CLINIC | Age: 82
End: 2022-03-10

## 2022-03-10 ENCOUNTER — OFFICE VISIT (OUTPATIENT)
Dept: PULMONOLOGY | Facility: CLINIC | Age: 82
End: 2022-03-10
Payer: MEDICARE

## 2022-03-10 VITALS
OXYGEN SATURATION: 96 % | BODY MASS INDEX: 22.98 KG/M2 | RESPIRATION RATE: 14 BRPM | SYSTOLIC BLOOD PRESSURE: 134 MMHG | HEART RATE: 73 BPM | DIASTOLIC BLOOD PRESSURE: 78 MMHG | WEIGHT: 143 LBS | HEIGHT: 66 IN

## 2022-03-10 DIAGNOSIS — R05.9 COUGH: ICD-10-CM

## 2022-03-10 DIAGNOSIS — R93.89 ABNORMAL CXR: ICD-10-CM

## 2022-03-10 DIAGNOSIS — R06.02 SOB (SHORTNESS OF BREATH): Primary | ICD-10-CM

## 2022-03-10 PROCEDURE — 99204 OFFICE O/P NEW MOD 45 MIN: CPT | Performed by: INTERNAL MEDICINE

## 2022-03-10 NOTE — TELEPHONE ENCOUNTER
Pt called in stating the orders he got today have an expiration date of today and he needs it adjusted.  Please follow up

## 2022-03-29 ENCOUNTER — LAB ENCOUNTER (OUTPATIENT)
Dept: LAB | Facility: HOSPITAL | Age: 82
End: 2022-03-29
Attending: INTERNAL MEDICINE
Payer: MEDICARE

## 2022-03-29 DIAGNOSIS — R06.02 SOB (SHORTNESS OF BREATH): ICD-10-CM

## 2022-03-29 LAB — SARS-COV-2 RNA RESP QL NAA+PROBE: NOT DETECTED

## 2022-04-01 ENCOUNTER — HOSPITAL ENCOUNTER (OUTPATIENT)
Dept: RESPIRATORY THERAPY | Facility: HOSPITAL | Age: 82
Discharge: HOME | End: 2022-04-01
Attending: INTERNAL MEDICINE
Payer: MEDICARE

## 2022-04-01 ENCOUNTER — HOSPITAL ENCOUNTER (OUTPATIENT)
Dept: CT IMAGING | Facility: HOSPITAL | Age: 82
Discharge: HOME | End: 2022-04-01
Attending: INTERNAL MEDICINE
Payer: MEDICARE

## 2022-04-01 DIAGNOSIS — R05.9 COUGH: ICD-10-CM

## 2022-04-01 DIAGNOSIS — R06.02 SOB (SHORTNESS OF BREATH): ICD-10-CM

## 2022-04-01 DIAGNOSIS — R93.89 ABNORMAL CXR: ICD-10-CM

## 2022-04-01 PROCEDURE — 94729 DIFFUSING CAPACITY: CPT | Performed by: INTERNAL MEDICINE

## 2022-04-01 PROCEDURE — 94060 EVALUATION OF WHEEZING: CPT | Performed by: INTERNAL MEDICINE

## 2022-04-01 PROCEDURE — 71250 CT THORAX DX C-: CPT | Performed by: INTERNAL MEDICINE

## 2022-04-01 PROCEDURE — 94726 PLETHYSMOGRAPHY LUNG VOLUMES: CPT | Performed by: INTERNAL MEDICINE

## 2022-04-07 ENCOUNTER — PATIENT MESSAGE (OUTPATIENT)
Dept: PULMONOLOGY | Facility: CLINIC | Age: 82
End: 2022-04-07

## 2022-04-07 ENCOUNTER — HOSPITAL ENCOUNTER (OUTPATIENT)
Dept: GENERAL RADIOLOGY | Facility: HOSPITAL | Age: 82
Discharge: HOME | End: 2022-04-07
Attending: INTERNAL MEDICINE
Payer: MEDICARE

## 2022-04-07 DIAGNOSIS — R05.9 COUGH: ICD-10-CM

## 2022-04-07 PROCEDURE — 92611 MOTION FLUOROSCOPY/SWALLOW: CPT

## 2022-04-07 PROCEDURE — 74230 X-RAY XM SWLNG FUNCJ C+: CPT | Performed by: INTERNAL MEDICINE

## 2022-04-23 ENCOUNTER — NURSE TRIAGE (OUTPATIENT)
Dept: INTERNAL MEDICINE CLINIC | Facility: CLINIC | Age: 82
End: 2022-04-23

## 2022-04-23 NOTE — TELEPHONE ENCOUNTER
Since he only had a single loose stool this morning, and assuming that bleeding was minimal, would recommend close observation for now.   Office visit if symptoms persist.

## 2022-04-27 ENCOUNTER — OFFICE VISIT (OUTPATIENT)
Dept: PULMONOLOGY | Facility: CLINIC | Age: 82
End: 2022-04-27
Payer: MEDICARE

## 2022-04-27 VITALS
DIASTOLIC BLOOD PRESSURE: 73 MMHG | HEART RATE: 68 BPM | SYSTOLIC BLOOD PRESSURE: 131 MMHG | WEIGHT: 144 LBS | BODY MASS INDEX: 23.14 KG/M2 | HEIGHT: 66 IN | RESPIRATION RATE: 16 BRPM | OXYGEN SATURATION: 98 %

## 2022-04-27 DIAGNOSIS — J84.9 ILD (INTERSTITIAL LUNG DISEASE) (HCC): Primary | ICD-10-CM

## 2022-05-17 ENCOUNTER — TELEPHONE (OUTPATIENT)
Dept: PULMONOLOGY | Facility: CLINIC | Age: 82
End: 2022-05-17

## 2022-05-26 ENCOUNTER — TELEPHONE (OUTPATIENT)
Dept: PULMONOLOGY | Facility: CLINIC | Age: 82
End: 2022-05-26

## 2022-05-27 ENCOUNTER — TELEPHONE (OUTPATIENT)
Dept: PULMONOLOGY | Facility: CLINIC | Age: 82
End: 2022-05-27

## 2022-05-27 NOTE — TELEPHONE ENCOUNTER
Received Fax from E requesting notes and O2 saturation results. Faxed over Oximetry results and progress notes. Received confirmation.

## 2022-05-31 ENCOUNTER — TELEPHONE (OUTPATIENT)
Dept: PULMONOLOGY | Facility: CLINIC | Age: 82
End: 2022-05-31

## 2022-05-31 NOTE — TELEPHONE ENCOUNTER
Fatimah Gonzalez, [address] [place] [address] calling regarding order for oxygen, need progress notes, diagnosis and saturation. Also just faxed form to be completed. Request to send message as high priority, please call to update at [phone],AEGSYC.

## 2022-06-01 NOTE — TELEPHONE ENCOUNTER
Spoke to Jeffry visit noted, stacia sherwood and signed form (signed by Carolyn Adame) faxed to [facility] and confirmation received.

## 2022-06-02 ENCOUNTER — TELEPHONE (OUTPATIENT)
Dept: PULMONOLOGY | Facility: CLINIC | Age: 82
End: 2022-06-02

## 2022-06-02 NOTE — TELEPHONE ENCOUNTER
Michelle Childs from [address] [place] [address] states they need new progress notes because the one they have is over 27days old, for the O2.

## 2022-06-02 NOTE — TELEPHONE ENCOUNTER
Spoke with Kera Buchanan at [facility] regarding message below. Informed Kera Buchanan that patient had office visit on 4/27/22 and O2 order was dated 5/25/22 which is within the 30 day period and order was faxed on 5/26 (See telephone encounter 5/26/22). Also informed her that office did receive HME form on 5/31/22 to be filled out and was faxed back on 5/31/22. Kera Buchanan states she will discuss with supervisor and pulmonary office to get a call back.

## 2022-06-06 NOTE — TELEPHONE ENCOUNTER
Spoke with Victorina Travis at [facility] to follow-up. Per Victorina Travis- order is still in verifications and to follow-up later this afternoon.

## 2022-06-06 NOTE — TELEPHONE ENCOUNTER
Followed up with Aly Foote at [facility] for update. Aly Foote states she will forward message to verification dept and pulmo office to get call back.

## 2022-06-07 NOTE — TELEPHONE ENCOUNTER
Spoke with Nicole Noguera regarding messages below. Nicole Noguera states she will discuss with \[facility] [place] [facility]\"" and will call [facility] office back.

## 2022-06-08 NOTE — TELEPHONE ENCOUNTER
Discussed with Kenny Simons at [facility] regarding modifications of order. Also informed Rebecca Gonzalez RN.

## 2022-06-09 ENCOUNTER — TELEPHONE (OUTPATIENT)
Dept: PULMONOLOGY | Facility: CLINIC | Age: 82
End: 2022-06-09

## 2022-06-09 NOTE — TELEPHONE ENCOUNTER
Incoming call from Pepe Jason who stated patient refused 02 delivery. Requested RN to call patient. Spoke to patient who is requesting to speak with Dr. Gregorio Kolb first before accepting oxygen order. Patient doesn't want to be dependant on 02 and isn't sure he needs it. Education provided but patient insisting on speaking with Dr. Gregorio Kolb first.     Dr. Abraham Herrera requesting to speak with you regarding overnight ox.

## 2022-06-09 NOTE — TELEPHONE ENCOUNTER
Form signed and dated by Angelo Thomas. Spoke to Oscar forbes @ [facility] who will pickup form today. Shala Sandoval called and stated paperwork has been accepted and all is good to go.

## 2022-06-16 ENCOUNTER — TELEPHONE (OUTPATIENT)
Dept: PULMONOLOGY | Facility: CLINIC | Age: 82
End: 2022-06-16

## 2022-06-19 ENCOUNTER — PATIENT MESSAGE (OUTPATIENT)
Dept: PULMONOLOGY | Facility: CLINIC | Age: 82
End: 2022-06-19

## 2022-06-21 ENCOUNTER — PATIENT MESSAGE (OUTPATIENT)
Dept: PULMONOLOGY | Facility: CLINIC | Age: 82
End: 2022-06-21

## 2022-06-21 NOTE — TELEPHONE ENCOUNTER
From: Avelino Boyce  To: Luz Maria Kendall MD  Sent: 6/19/2022 4:42 PM CDT  Subject: [employer]    This firm delivered an O2 machine to my place and it worked for two days. I have no O2 at night for three nights that was scripted for me, and you supposedly rely on [employer] to service your patients. Well, they failed me after placing at least two calls a day and leaving messages at their website. I think its time you changed vendors.     praneeth lowery

## 2022-08-29 ENCOUNTER — TELEPHONE (OUTPATIENT)
Dept: PULMONOLOGY | Facility: CLINIC | Age: 82
End: 2022-08-29

## 2022-08-29 NOTE — TELEPHONE ENCOUNTER
Central scheduling is calling to ask if it is ok for patient to schedule PFT sooner then what is requested in order. Please call.

## 2022-09-17 ENCOUNTER — PATIENT MESSAGE (OUTPATIENT)
Dept: PULMONOLOGY | Facility: CLINIC | Age: 82
End: 2022-09-17

## 2022-09-17 DIAGNOSIS — J84.9 ILD (INTERSTITIAL LUNG DISEASE) (HCC): Primary | ICD-10-CM

## 2022-09-19 NOTE — TELEPHONE ENCOUNTER
Dr. Dodson Handler- patient is inquiring if he is a candidate for pulmonary rehab. Order pended if agreeable.

## 2022-09-21 ENCOUNTER — TELEPHONE (OUTPATIENT)
Dept: PULMONOLOGY | Facility: CLINIC | Age: 82
End: 2022-09-21

## 2022-09-21 NOTE — TELEPHONE ENCOUNTER
Patient is due for CT chest hi resolution per chronic calendar. Appointment is already scheduled. Reminder letter sent to patient.

## 2022-10-06 ENCOUNTER — CARDPULM VISIT (OUTPATIENT)
Dept: CARDIAC REHAB | Facility: HOSPITAL | Age: 82
End: 2022-10-06
Attending: INTERNAL MEDICINE
Payer: MEDICARE

## 2022-10-06 DIAGNOSIS — J84.9 ILD (INTERSTITIAL LUNG DISEASE) (HCC): ICD-10-CM

## 2022-10-07 ENCOUNTER — LAB ENCOUNTER (OUTPATIENT)
Dept: LAB | Facility: HOSPITAL | Age: 82
End: 2022-10-07
Attending: INTERNAL MEDICINE
Payer: MEDICARE

## 2022-10-07 DIAGNOSIS — J84.9 ILD (INTERSTITIAL LUNG DISEASE) (HCC): ICD-10-CM

## 2022-10-08 LAB — SARS-COV-2 RNA RESP QL NAA+PROBE: NOT DETECTED

## 2022-10-10 ENCOUNTER — HOSPITAL ENCOUNTER (OUTPATIENT)
Dept: RESPIRATORY THERAPY | Facility: HOSPITAL | Age: 82
Discharge: HOME | End: 2022-10-10
Attending: INTERNAL MEDICINE
Payer: MEDICARE

## 2022-10-10 ENCOUNTER — HOSPITAL ENCOUNTER (OUTPATIENT)
Dept: CT IMAGING | Facility: HOSPITAL | Age: 82
Discharge: HOME | End: 2022-10-10
Attending: INTERNAL MEDICINE
Payer: MEDICARE

## 2022-10-10 DIAGNOSIS — J84.9 ILD (INTERSTITIAL LUNG DISEASE) (HCC): ICD-10-CM

## 2022-10-10 PROCEDURE — 71250 CT THORAX DX C-: CPT | Performed by: INTERNAL MEDICINE

## 2022-10-10 PROCEDURE — 94726 PLETHYSMOGRAPHY LUNG VOLUMES: CPT

## 2022-10-10 PROCEDURE — 94729 DIFFUSING CAPACITY: CPT

## 2022-10-10 PROCEDURE — 94010 BREATHING CAPACITY TEST: CPT

## 2022-10-11 ENCOUNTER — APPOINTMENT (OUTPATIENT)
Dept: CARDIAC REHAB | Facility: HOSPITAL | Age: 82
End: 2022-10-11
Attending: INTERNAL MEDICINE
Payer: MEDICARE

## 2022-10-13 ENCOUNTER — CARDPULM VISIT (OUTPATIENT)
Dept: CARDIAC REHAB | Facility: HOSPITAL | Age: 82
End: 2022-10-13
Attending: INTERNAL MEDICINE
Payer: MEDICARE

## 2022-10-18 ENCOUNTER — CARDPULM VISIT (OUTPATIENT)
Dept: CARDIAC REHAB | Facility: HOSPITAL | Age: 82
End: 2022-10-18
Attending: INTERNAL MEDICINE
Payer: MEDICARE

## 2022-10-20 ENCOUNTER — CARDPULM VISIT (OUTPATIENT)
Dept: CARDIAC REHAB | Facility: HOSPITAL | Age: 82
End: 2022-10-20
Attending: INTERNAL MEDICINE
Payer: MEDICARE

## 2022-10-25 ENCOUNTER — CARDPULM VISIT (OUTPATIENT)
Dept: CARDIAC REHAB | Facility: HOSPITAL | Age: 82
End: 2022-10-25
Attending: INTERNAL MEDICINE
Payer: MEDICARE

## 2022-10-26 ENCOUNTER — OFFICE VISIT (OUTPATIENT)
Dept: PULMONOLOGY | Facility: CLINIC | Age: 82
End: 2022-10-26
Payer: MEDICARE

## 2022-10-26 VITALS
WEIGHT: 149.19 LBS | BODY MASS INDEX: 24 KG/M2 | DIASTOLIC BLOOD PRESSURE: 71 MMHG | SYSTOLIC BLOOD PRESSURE: 150 MMHG | HEART RATE: 62 BPM | OXYGEN SATURATION: 96 %

## 2022-10-26 DIAGNOSIS — Z23 NEED FOR VACCINATION: ICD-10-CM

## 2022-10-26 DIAGNOSIS — G47.34 NOCTURNAL HYPOXEMIA: ICD-10-CM

## 2022-10-26 DIAGNOSIS — J84.9 ILD (INTERSTITIAL LUNG DISEASE) (HCC): Primary | ICD-10-CM

## 2022-10-26 DIAGNOSIS — R06.09 DOE (DYSPNEA ON EXERTION): ICD-10-CM

## 2022-10-26 PROBLEM — J44.9 CHRONIC OBSTRUCTIVE PULMONARY DISEASE, UNSPECIFIED (HCC): Status: ACTIVE | Noted: 2022-10-26

## 2022-10-26 PROCEDURE — 90662 IIV NO PRSV INCREASED AG IM: CPT | Performed by: INTERNAL MEDICINE

## 2022-10-26 PROCEDURE — G0008 ADMIN INFLUENZA VIRUS VAC: HCPCS | Performed by: INTERNAL MEDICINE

## 2022-10-26 PROCEDURE — 99214 OFFICE O/P EST MOD 30 MIN: CPT | Performed by: INTERNAL MEDICINE

## 2022-10-26 RX ORDER — ALBUTEROL SULFATE 90 UG/1
2 AEROSOL, METERED RESPIRATORY (INHALATION) EVERY 6 HOURS PRN
Qty: 3 EACH | Refills: 4 | Status: SHIPPED | OUTPATIENT
Start: 2022-10-26

## 2022-10-27 ENCOUNTER — TELEPHONE (OUTPATIENT)
Dept: PULMONOLOGY | Facility: CLINIC | Age: 82
End: 2022-10-27

## 2022-10-27 ENCOUNTER — CARDPULM VISIT (OUTPATIENT)
Dept: CARDIAC REHAB | Facility: HOSPITAL | Age: 82
End: 2022-10-27
Attending: INTERNAL MEDICINE
Payer: MEDICARE

## 2022-10-27 DIAGNOSIS — R06.09 DOE (DYSPNEA ON EXERTION): Primary | ICD-10-CM

## 2022-10-27 NOTE — TELEPHONE ENCOUNTER
Avril Hopper states patient is trying to schedule for 2D Doppler but diagnosis failed Medicare Necessity - requesting new orders with new dx code. For additional questions please call. Thank you.

## 2022-10-28 NOTE — TELEPHONE ENCOUNTER
Dr. Leopold Breed code used to order Doppler  dosen't meet criteria. Please see pended order with Dx code: dyspnea on exertion and sign if agreeable.

## 2022-11-01 ENCOUNTER — CARDPULM VISIT (OUTPATIENT)
Dept: CARDIAC REHAB | Facility: HOSPITAL | Age: 82
End: 2022-11-01
Attending: INTERNAL MEDICINE
Payer: MEDICARE

## 2022-11-03 ENCOUNTER — CARDPULM VISIT (OUTPATIENT)
Dept: CARDIAC REHAB | Facility: HOSPITAL | Age: 82
End: 2022-11-03
Attending: INTERNAL MEDICINE
Payer: MEDICARE

## 2022-11-08 ENCOUNTER — CARDPULM VISIT (OUTPATIENT)
Dept: CARDIAC REHAB | Facility: HOSPITAL | Age: 82
End: 2022-11-08
Attending: INTERNAL MEDICINE
Payer: MEDICARE

## 2022-11-10 ENCOUNTER — CARDPULM VISIT (OUTPATIENT)
Dept: CARDIAC REHAB | Facility: HOSPITAL | Age: 82
End: 2022-11-10
Attending: INTERNAL MEDICINE
Payer: MEDICARE

## 2022-11-15 ENCOUNTER — APPOINTMENT (OUTPATIENT)
Dept: CARDIAC REHAB | Facility: HOSPITAL | Age: 82
End: 2022-11-15
Attending: INTERNAL MEDICINE
Payer: MEDICARE

## 2022-11-16 ENCOUNTER — HOSPITAL ENCOUNTER (OUTPATIENT)
Dept: CV DIAGNOSTICS | Facility: HOSPITAL | Age: 82
Discharge: HOME | End: 2022-11-16
Attending: INTERNAL MEDICINE
Payer: MEDICARE

## 2022-11-16 DIAGNOSIS — R06.09 DOE (DYSPNEA ON EXERTION): ICD-10-CM

## 2022-11-16 PROCEDURE — 93306 TTE W/DOPPLER COMPLETE: CPT | Performed by: INTERNAL MEDICINE

## 2022-11-17 ENCOUNTER — CARDPULM VISIT (OUTPATIENT)
Dept: CARDIAC REHAB | Facility: HOSPITAL | Age: 82
End: 2022-11-17
Attending: INTERNAL MEDICINE
Payer: MEDICARE

## 2022-11-22 ENCOUNTER — CARDPULM VISIT (OUTPATIENT)
Dept: CARDIAC REHAB | Facility: HOSPITAL | Age: 82
End: 2022-11-22
Attending: INTERNAL MEDICINE
Payer: MEDICARE

## 2022-11-29 ENCOUNTER — APPOINTMENT (OUTPATIENT)
Dept: CARDIAC REHAB | Facility: HOSPITAL | Age: 82
End: 2022-11-29
Attending: INTERNAL MEDICINE
Payer: MEDICARE

## 2022-12-01 ENCOUNTER — CARDPULM VISIT (OUTPATIENT)
Dept: CARDIAC REHAB | Facility: HOSPITAL | Age: 82
End: 2022-12-01
Attending: INTERNAL MEDICINE
Payer: MEDICARE

## 2022-12-06 ENCOUNTER — CARDPULM VISIT (OUTPATIENT)
Dept: CARDIAC REHAB | Facility: HOSPITAL | Age: 82
End: 2022-12-06
Attending: INTERNAL MEDICINE
Payer: MEDICARE

## 2022-12-08 ENCOUNTER — CARDPULM VISIT (OUTPATIENT)
Dept: CARDIAC REHAB | Facility: HOSPITAL | Age: 82
End: 2022-12-08
Attending: INTERNAL MEDICINE
Payer: MEDICARE

## 2022-12-13 ENCOUNTER — APPOINTMENT (OUTPATIENT)
Dept: CARDIAC REHAB | Facility: HOSPITAL | Age: 82
End: 2022-12-13
Attending: INTERNAL MEDICINE
Payer: MEDICARE

## 2022-12-15 ENCOUNTER — APPOINTMENT (OUTPATIENT)
Dept: CARDIAC REHAB | Facility: HOSPITAL | Age: 82
End: 2022-12-15
Attending: INTERNAL MEDICINE
Payer: MEDICARE

## 2022-12-29 ENCOUNTER — CARDPULM VISIT (OUTPATIENT)
Dept: CARDIAC REHAB | Facility: HOSPITAL | Age: 82
End: 2022-12-29
Attending: INTERNAL MEDICINE
Payer: MEDICARE

## 2023-01-03 ENCOUNTER — CARDPULM VISIT (OUTPATIENT)
Dept: CARDIAC REHAB | Facility: HOSPITAL | Age: 83
End: 2023-01-03
Attending: INTERNAL MEDICINE
Payer: MEDICARE

## 2023-01-05 ENCOUNTER — APPOINTMENT (OUTPATIENT)
Dept: CARDIAC REHAB | Facility: HOSPITAL | Age: 83
End: 2023-01-05
Attending: INTERNAL MEDICINE
Payer: MEDICARE

## 2023-01-07 ENCOUNTER — PATIENT MESSAGE (OUTPATIENT)
Dept: PULMONOLOGY | Facility: CLINIC | Age: 83
End: 2023-01-07

## 2023-01-09 NOTE — TELEPHONE ENCOUNTER
Dr. Leann Hoffman- please see message from patient below. He is inquiring about a secondary exercise program and any precautions of doing so.

## 2023-01-09 NOTE — TELEPHONE ENCOUNTER
From: Avelino Boyce  To: Luz Maria Kendall MD  Sent: 1/7/2023 4:57 PM CST  Subject: Exercise    In Pulmonary Therapy I am currently exercising approximately 45 minutes twice a week. I would like to step up my exercise program by using [employer] in [place] an additional two dates a week for an hour or longer. Is this a good idea? Any precautions if I do this additional exercise? Do you get reports on my activity in this therapy session?

## 2023-01-10 ENCOUNTER — APPOINTMENT (OUTPATIENT)
Dept: CARDIAC REHAB | Facility: HOSPITAL | Age: 83
End: 2023-01-10
Attending: INTERNAL MEDICINE
Payer: MEDICARE

## 2023-01-12 ENCOUNTER — CARDPULM VISIT (OUTPATIENT)
Dept: CARDIAC REHAB | Facility: HOSPITAL | Age: 83
End: 2023-01-12
Attending: INTERNAL MEDICINE
Payer: MEDICARE

## 2023-01-17 ENCOUNTER — CARDPULM VISIT (OUTPATIENT)
Dept: CARDIAC REHAB | Facility: HOSPITAL | Age: 83
End: 2023-01-17
Attending: INTERNAL MEDICINE
Payer: MEDICARE

## 2023-01-19 ENCOUNTER — CARDPULM VISIT (OUTPATIENT)
Dept: CARDIAC REHAB | Facility: HOSPITAL | Age: 83
End: 2023-01-19
Attending: INTERNAL MEDICINE
Payer: MEDICARE

## 2023-01-24 ENCOUNTER — APPOINTMENT (OUTPATIENT)
Dept: CARDIAC REHAB | Facility: HOSPITAL | Age: 83
End: 2023-01-24
Attending: INTERNAL MEDICINE
Payer: MEDICARE

## 2023-01-26 ENCOUNTER — APPOINTMENT (OUTPATIENT)
Dept: CARDIAC REHAB | Facility: HOSPITAL | Age: 83
End: 2023-01-26
Attending: INTERNAL MEDICINE
Payer: MEDICARE

## 2023-02-01 ENCOUNTER — OFFICE VISIT (OUTPATIENT)
Dept: INTERNAL MEDICINE CLINIC | Facility: CLINIC | Age: 83
End: 2023-02-01

## 2023-02-01 VITALS
HEART RATE: 105 BPM | WEIGHT: 146 LBS | DIASTOLIC BLOOD PRESSURE: 58 MMHG | SYSTOLIC BLOOD PRESSURE: 122 MMHG | HEIGHT: 66 IN | BODY MASS INDEX: 23.46 KG/M2

## 2023-02-01 DIAGNOSIS — E78.5 DYSLIPIDEMIA: ICD-10-CM

## 2023-02-01 DIAGNOSIS — Z00.00 ENCOUNTER FOR ANNUAL HEALTH EXAMINATION: ICD-10-CM

## 2023-02-01 DIAGNOSIS — N18.30 STAGE 3 CHRONIC KIDNEY DISEASE, UNSPECIFIED WHETHER STAGE 3A OR 3B CKD (HCC): ICD-10-CM

## 2023-02-01 DIAGNOSIS — Z86.006 HISTORY OF MELANOMA IN SITU: ICD-10-CM

## 2023-02-01 DIAGNOSIS — J84.9 ILD (INTERSTITIAL LUNG DISEASE) (HCC): ICD-10-CM

## 2023-02-01 DIAGNOSIS — K22.70 BARRETT'S ESOPHAGUS WITHOUT DYSPLASIA: ICD-10-CM

## 2023-02-01 DIAGNOSIS — D50.8 OTHER IRON DEFICIENCY ANEMIA: ICD-10-CM

## 2023-02-01 DIAGNOSIS — D12.6 ADENOMATOUS POLYP OF COLON, UNSPECIFIED PART OF COLON: ICD-10-CM

## 2023-02-01 DIAGNOSIS — I70.0 AORTIC ATHEROSCLEROSIS (HCC): ICD-10-CM

## 2023-02-01 DIAGNOSIS — B35.1 ONYCHOMYCOSIS: ICD-10-CM

## 2023-02-01 DIAGNOSIS — K21.9 GASTROESOPHAGEAL REFLUX DISEASE, UNSPECIFIED WHETHER ESOPHAGITIS PRESENT: ICD-10-CM

## 2023-02-01 DIAGNOSIS — Z00.00 ANNUAL PHYSICAL EXAM: Primary | ICD-10-CM

## 2023-02-01 PROBLEM — J44.9 CHRONIC OBSTRUCTIVE PULMONARY DISEASE, UNSPECIFIED (HCC): Status: RESOLVED | Noted: 2022-10-26 | Resolved: 2023-02-01

## 2023-02-01 PROCEDURE — 1126F AMNT PAIN NOTED NONE PRSNT: CPT | Performed by: INTERNAL MEDICINE

## 2023-02-01 PROCEDURE — G0439 PPPS, SUBSEQ VISIT: HCPCS | Performed by: INTERNAL MEDICINE

## 2023-02-01 PROCEDURE — 99213 OFFICE O/P EST LOW 20 MIN: CPT | Performed by: INTERNAL MEDICINE

## 2023-02-02 ENCOUNTER — CARDPULM VISIT (OUTPATIENT)
Dept: CARDIAC REHAB | Facility: HOSPITAL | Age: 83
End: 2023-02-02
Attending: INTERNAL MEDICINE
Payer: MEDICARE

## 2023-02-03 ENCOUNTER — LAB ENCOUNTER (OUTPATIENT)
Dept: LAB | Facility: HOSPITAL | Age: 83
End: 2023-02-03
Attending: INTERNAL MEDICINE
Payer: MEDICARE

## 2023-02-03 DIAGNOSIS — N18.30 STAGE 3 CHRONIC KIDNEY DISEASE, UNSPECIFIED WHETHER STAGE 3A OR 3B CKD (HCC): ICD-10-CM

## 2023-02-03 DIAGNOSIS — Z00.00 ANNUAL PHYSICAL EXAM: ICD-10-CM

## 2023-02-03 DIAGNOSIS — E78.5 DYSLIPIDEMIA: ICD-10-CM

## 2023-02-03 LAB
ALBUMIN SERPL-MCNC: 3.7 G/DL (ref 3.4–5)
ALBUMIN/GLOB SERPL: 1.1 {RATIO} (ref 1–2)
ALP LIVER SERPL-CCNC: 88 U/L
ALT SERPL-CCNC: 19 U/L
ANION GAP SERPL CALC-SCNC: 6 MMOL/L (ref 0–18)
AST SERPL-CCNC: 23 U/L (ref 15–37)
BILIRUB SERPL-MCNC: 0.7 MG/DL (ref 0.1–2)
BUN BLD-MCNC: 17 MG/DL (ref 7–18)
BUN/CREAT SERPL: 12.8 (ref 10–20)
CALCIUM BLD-MCNC: 9.3 MG/DL (ref 8.5–10.1)
CHLORIDE SERPL-SCNC: 107 MMOL/L (ref 98–112)
CHOLEST SERPL-MCNC: 156 MG/DL (ref ?–200)
CO2 SERPL-SCNC: 29 MMOL/L (ref 21–32)
CREAT BLD-MCNC: 1.33 MG/DL
DEPRECATED RDW RBC AUTO: 45.5 FL (ref 35.1–46.3)
ERYTHROCYTE [DISTWIDTH] IN BLOOD BY AUTOMATED COUNT: 13 % (ref 11–15)
FASTING PATIENT LIPID ANSWER: YES
FASTING STATUS PATIENT QL REPORTED: YES
GFR SERPLBLD BASED ON 1.73 SQ M-ARVRAT: 53 ML/MIN/1.73M2 (ref 60–?)
GLOBULIN PLAS-MCNC: 3.4 G/DL (ref 2.8–4.4)
GLUCOSE BLD-MCNC: 102 MG/DL (ref 70–99)
HCT VFR BLD AUTO: 44.8 %
HDLC SERPL-MCNC: 41 MG/DL (ref 40–59)
HGB BLD-MCNC: 14.2 G/DL
LDLC SERPL CALC-MCNC: 83 MG/DL (ref ?–100)
MCH RBC QN AUTO: 30.1 PG (ref 26–34)
MCHC RBC AUTO-ENTMCNC: 31.7 G/DL (ref 31–37)
MCV RBC AUTO: 94.9 FL
NONHDLC SERPL-MCNC: 115 MG/DL (ref ?–130)
OSMOLALITY SERPL CALC.SUM OF ELEC: 296 MOSM/KG (ref 275–295)
PLATELET # BLD AUTO: 241 10(3)UL (ref 150–450)
POTASSIUM SERPL-SCNC: 4.6 MMOL/L (ref 3.5–5.1)
PROT SERPL-MCNC: 7.1 G/DL (ref 6.4–8.2)
RBC # BLD AUTO: 4.72 X10(6)UL
SODIUM SERPL-SCNC: 142 MMOL/L (ref 136–145)
TRIGL SERPL-MCNC: 191 MG/DL (ref 30–149)
VLDLC SERPL CALC-MCNC: 30 MG/DL (ref 0–30)
WBC # BLD AUTO: 7.9 X10(3) UL (ref 4–11)

## 2023-02-03 PROCEDURE — 85027 COMPLETE CBC AUTOMATED: CPT

## 2023-02-03 PROCEDURE — 80053 COMPREHEN METABOLIC PANEL: CPT

## 2023-02-03 PROCEDURE — 80061 LIPID PANEL: CPT

## 2023-02-03 PROCEDURE — 36415 COLL VENOUS BLD VENIPUNCTURE: CPT

## 2023-02-07 ENCOUNTER — CARDPULM VISIT (OUTPATIENT)
Dept: CARDIAC REHAB | Facility: HOSPITAL | Age: 83
End: 2023-02-07
Attending: INTERNAL MEDICINE
Payer: MEDICARE

## 2023-02-09 ENCOUNTER — APPOINTMENT (OUTPATIENT)
Dept: CARDIAC REHAB | Facility: HOSPITAL | Age: 83
End: 2023-02-09
Attending: INTERNAL MEDICINE
Payer: MEDICARE

## 2023-02-14 ENCOUNTER — APPOINTMENT (OUTPATIENT)
Dept: CARDIAC REHAB | Facility: HOSPITAL | Age: 83
End: 2023-02-14
Attending: INTERNAL MEDICINE
Payer: MEDICARE

## 2023-02-16 ENCOUNTER — APPOINTMENT (OUTPATIENT)
Dept: CARDIAC REHAB | Facility: HOSPITAL | Age: 83
End: 2023-02-16
Attending: INTERNAL MEDICINE
Payer: MEDICARE

## 2023-02-28 ENCOUNTER — CARDPULM VISIT (OUTPATIENT)
Dept: CARDIAC REHAB | Facility: HOSPITAL | Age: 83
End: 2023-02-28
Attending: INTERNAL MEDICINE
Payer: MEDICARE

## 2023-03-02 ENCOUNTER — CARDPULM VISIT (OUTPATIENT)
Dept: CARDIAC REHAB | Facility: HOSPITAL | Age: 83
End: 2023-03-02
Attending: INTERNAL MEDICINE
Payer: MEDICARE

## 2023-03-07 ENCOUNTER — CARDPULM VISIT (OUTPATIENT)
Dept: CARDIAC REHAB | Facility: HOSPITAL | Age: 83
End: 2023-03-07
Attending: INTERNAL MEDICINE
Payer: MEDICARE

## 2023-03-09 ENCOUNTER — CARDPULM VISIT (OUTPATIENT)
Dept: CARDIAC REHAB | Facility: HOSPITAL | Age: 83
End: 2023-03-09
Attending: INTERNAL MEDICINE
Payer: MEDICARE

## 2023-03-14 ENCOUNTER — CARDPULM VISIT (OUTPATIENT)
Dept: CARDIAC REHAB | Facility: HOSPITAL | Age: 83
End: 2023-03-14
Attending: INTERNAL MEDICINE
Payer: MEDICARE

## 2023-03-16 ENCOUNTER — CARDPULM VISIT (OUTPATIENT)
Dept: CARDIAC REHAB | Facility: HOSPITAL | Age: 83
End: 2023-03-16
Attending: INTERNAL MEDICINE
Payer: MEDICARE

## 2023-03-21 ENCOUNTER — CARDPULM VISIT (OUTPATIENT)
Dept: CARDIAC REHAB | Facility: HOSPITAL | Age: 83
End: 2023-03-21
Attending: INTERNAL MEDICINE
Payer: MEDICARE

## 2023-03-28 ENCOUNTER — CARDPULM VISIT (OUTPATIENT)
Dept: CARDIAC REHAB | Facility: HOSPITAL | Age: 83
End: 2023-03-28
Attending: INTERNAL MEDICINE
Payer: MEDICARE

## 2023-03-30 ENCOUNTER — CARDPULM VISIT (OUTPATIENT)
Dept: CARDIAC REHAB | Facility: HOSPITAL | Age: 83
End: 2023-03-30
Attending: INTERNAL MEDICINE
Payer: MEDICARE

## 2023-03-31 ENCOUNTER — OFFICE VISIT (OUTPATIENT)
Dept: PODIATRY CLINIC | Facility: CLINIC | Age: 83
End: 2023-03-31

## 2023-03-31 DIAGNOSIS — B35.1 ONYCHOMYCOSIS: Primary | ICD-10-CM

## 2023-03-31 DIAGNOSIS — L60.2 ONYCHOGRYPHOSIS: ICD-10-CM

## 2023-03-31 PROCEDURE — 99203 OFFICE O/P NEW LOW 30 MIN: CPT | Performed by: PODIATRIST

## 2023-04-11 ENCOUNTER — APPOINTMENT (OUTPATIENT)
Dept: CARDIAC REHAB | Facility: HOSPITAL | Age: 83
End: 2023-04-11
Attending: INTERNAL MEDICINE
Payer: MEDICARE

## 2023-04-13 ENCOUNTER — APPOINTMENT (OUTPATIENT)
Dept: CARDIAC REHAB | Facility: HOSPITAL | Age: 83
End: 2023-04-13
Attending: INTERNAL MEDICINE
Payer: MEDICARE

## 2023-04-14 ENCOUNTER — TELEPHONE (OUTPATIENT)
Dept: PULMONOLOGY | Facility: CLINIC | Age: 83
End: 2023-04-14

## 2023-04-14 NOTE — TELEPHONE ENCOUNTER
Received fax from [facility] requesting signed order for oxygen concentrator. Placed on Dr. Katherine Lockwood folder for review.

## 2023-04-18 ENCOUNTER — CARDPULM VISIT (OUTPATIENT)
Dept: CARDIAC REHAB | Facility: HOSPITAL | Age: 83
End: 2023-04-18
Attending: INTERNAL MEDICINE
Payer: MEDICARE

## 2023-04-20 ENCOUNTER — CARDPULM VISIT (OUTPATIENT)
Dept: CARDIAC REHAB | Facility: HOSPITAL | Age: 83
End: 2023-04-20
Attending: INTERNAL MEDICINE
Payer: MEDICARE

## 2023-04-25 ENCOUNTER — CARDPULM VISIT (OUTPATIENT)
Dept: CARDIAC REHAB | Facility: HOSPITAL | Age: 83
End: 2023-04-25
Attending: INTERNAL MEDICINE
Payer: MEDICARE

## 2023-04-27 ENCOUNTER — CARDPULM VISIT (OUTPATIENT)
Dept: CARDIAC REHAB | Facility: HOSPITAL | Age: 83
End: 2023-04-27
Attending: INTERNAL MEDICINE
Payer: MEDICARE

## 2023-05-09 ENCOUNTER — CARDPULM VISIT (OUTPATIENT)
Dept: CARDIAC REHAB | Facility: HOSPITAL | Age: 83
End: 2023-05-09
Attending: INTERNAL MEDICINE
Payer: MEDICARE

## 2023-05-22 ENCOUNTER — OFFICE VISIT (OUTPATIENT)
Dept: PULMONOLOGY | Facility: CLINIC | Age: 83
End: 2023-05-22

## 2023-05-22 VITALS
BODY MASS INDEX: 23.63 KG/M2 | OXYGEN SATURATION: 97 % | SYSTOLIC BLOOD PRESSURE: 113 MMHG | HEART RATE: 70 BPM | RESPIRATION RATE: 16 BRPM | WEIGHT: 147 LBS | HEIGHT: 66 IN | DIASTOLIC BLOOD PRESSURE: 58 MMHG

## 2023-05-22 DIAGNOSIS — J43.2 CENTRILOBULAR EMPHYSEMA (HCC): ICD-10-CM

## 2023-05-22 DIAGNOSIS — J30.1 SEASONAL ALLERGIC RHINITIS DUE TO POLLEN: ICD-10-CM

## 2023-05-22 DIAGNOSIS — J84.9 ILD (INTERSTITIAL LUNG DISEASE) (HCC): Primary | ICD-10-CM

## 2023-05-22 PROCEDURE — 99214 OFFICE O/P EST MOD 30 MIN: CPT | Performed by: INTERNAL MEDICINE

## 2023-09-01 ENCOUNTER — TELEMEDICINE (OUTPATIENT)
Dept: INTERNAL MEDICINE CLINIC | Facility: CLINIC | Age: 83
End: 2023-09-01

## 2023-09-01 ENCOUNTER — NURSE TRIAGE (OUTPATIENT)
Dept: INTERNAL MEDICINE CLINIC | Facility: CLINIC | Age: 83
End: 2023-09-01

## 2023-09-01 DIAGNOSIS — U07.1 COVID-19: Primary | ICD-10-CM

## 2023-09-01 LAB — AMB EXT COVID-19 RESULT: DETECTED

## 2023-09-11 ENCOUNTER — TELEMEDICINE (OUTPATIENT)
Dept: INTERNAL MEDICINE CLINIC | Facility: CLINIC | Age: 83
End: 2023-09-11

## 2023-09-11 DIAGNOSIS — U07.1 COVID-19: Primary | ICD-10-CM

## 2023-09-11 PROCEDURE — 99213 OFFICE O/P EST LOW 20 MIN: CPT | Performed by: INTERNAL MEDICINE

## 2023-09-11 NOTE — PATIENT INSTRUCTIONS
Please rest and maintain adequate hydration and continue to isolate and use a mask. Treat symptoms with Tylenol, Sudafed, warm salt water gargles and Robitussin as needed. Call if not soon better. Go to the ER ER if significant shortness of breath develops.

## 2023-09-11 NOTE — PROGRESS NOTES
Lisandra Tracy is a 80year old male here today for a telemedicine/video visit. HPI:   Please note that the following visit was completed using two-way, real-time interactive audio and video communication. This has been done in good izzy to provide continuity of care in the best interest of the provider-patient relationship, due to the ongoing public health crisis/national emergency and because of restrictions of visitation. There are limitations of this visit as no physical exam could be performed. Every conscious effort was taken to allow for sufficient and adequate time. This billing was spent on reviewing labs, medications, radiology tests and decision making. Appropriate medical decision-making and tests are ordered as detailed in the plan of care below. Video Visit. Lisandra Tracy verbally consents to a visit conducted using Telemedicine with live, interactive two way video and audio service on 9/11/2023. Patient understands and accepts financial responsibility for any deductible, co-insurance and/or co-pays associated with this service. The patient was in the state of [place] during this encounter. Duration of the service: 18 minutes, including clinical documentation     Summary of topics discussed:  please see note below. Call placed in lieu of follow up visit due to COVID-19 pandemic. A MyChart video visit was done. On August 30, he developed symptoms of headache, achiness, nasal congestion and rhinorrhea with clear drainage, slight sore throat and slight cough productive of clear sputum. He thinks he may have had a low-grade fever but never checked his temperature. No purulent nasal drainage, sinus pressure or pain, ear pain or shortness of breath above his baseline dyspnea from interstitial lung disease. He does wear oxygen at night. He tested positive for COVID on September 1. He had a video visit with another provider and a 5-day course of Paxlovid was prescribed.   He took the course of Paxlovid as prescribed with some improvement in symptoms. However he has some residual symptoms with nasal congestion and rhinorrhea being most bothersome. He did another COVID test today which was again positive. Medications reviewed, as listed below. He uses oxygen nightly. Current Outpatient Medications   Medication Sig Dispense Refill    Multiple Vitamins-Minerals (PRESERVISION AREDS 2+MULTI VIT OR)       Tiotropium Bromide-Olodaterol 2.5-2.5 MCG/ACT Inhalation Aero Soln Inhale 2 puffs into the lungs daily. 3 each 3    albuterol 108 (90 Base) MCG/ACT Inhalation Aero Soln Inhale 2 puffs into the lungs every 6 (six) hours as needed for Wheezing. inhale 2 puff by inhalation route  every 4 - 6 hours as needed 3 each 4    MULTIVITAMIN TAB/CAP Take 1 tablet by mouth daily.          Molds & Smuts           Runny nose  Penicillins             HIVES   Past Medical History:   Diagnosis Date    Aortic atherosclerosis ([facility] [address]     CXR 9-11    Stubbs esophagus 09/2016    EGD 9-16 with large paraesophageal hiatal hernia, Stubbs's esophagus, biopsy negative for dysplasia    BCC (basal cell carcinoma of skin) 2014    scalp    Carcinoma in situ of colon 09/2016    transverse    Chronic kidney disease, stage 3 (HCC)     Diverticulosis of large intestine     Dyslipidemia     GERD (gastroesophageal reflux disease)     Hiatal hernia     Moderate    ILD (interstitial lung disease) (HCC)     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; PFTs 10-22 with severe restriction with TLC 3.08 L (51%)    Iron deficiency anemia 06/2016    Melanoma in situ of cheek ([facility] [address] 07/2014    Paraesophageal hiatal hernia     Pneumonia 1985     Past Surgical History:   Procedure Laterality Date    APPENDECTOMY  1956    CATARACT EXTRACTION Bilateral  2015    COLONOSCOPY N/A 11/10/2018    Procedure: COLONOSCOPY;  Surgeon: Tamiko Castro MD;  Location: [address] [place] [address] ENDOSCOPY    COLONOSCOPY N/A 11/11/2021    Procedure: COLONOSCOPY;  Surgeon: Tamiko Castro MD;  Location: [address] [facility] ENDOSCOPY    COLONOSCOPY & POLYPECTOMY  9/15/16    EGD  9/15/16    INGUINAL HERNIA REPAIR Right     LAPAROSCOPY, SURGICAL; COLECTOMY, PARTIAL, W/ ANASTOMOS Right 2016    REPAIR RECURR [id number] [facility] Right     With right orchiectomy    SKIN SURGERY  2014    Excision melanoma in situ right cheek and BCCa scalp    TONSILLECTOMY  1948      Social History:  Social History     Socioeconomic History    Marital status:     Number of children: 1   Occupational History    Occupation:    Tobacco Use    Smoking status: Former     Packs/day: 1.00     Years: 36.00     Pack years: 36.00     Types: Cigarettes     Start date: 36     Quit date: 1992     Years since quittin.1    Smokeless tobacco: Never    Tobacco comments:     Started at age 16   Vaping Use    Vaping Use: Never used   Substance and Sexual Activity    Alcohol use: Yes     Alcohol/week: 0.0 standard drinks of alcohol     Comment: Infrequent    Drug use: No   Other Topics Concern    Pt has a pacemaker No    Pt has a defibrillator No    Reaction to local anesthetic No    Caffeine Concern Yes     Comment: Coffee 3 cups daily   Social History Narrative    Lives with wife near hospital    No pets    Work [employer]    - 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 HEENT: Voice normal  LUNGS: Breathing nonlabored  NEURO: Alert and appropriate. Affect normal. Mental status grossly intact      ASSESSMENT AND PLAN:   1. COVID-19  Relatively mild symptoms which are persisting, status post 5-day course of Paxlovid. Recommend adequate rest and fluids, continued isolation and masking, and symptom management-Tylenol, Sudafed, saline gargles, Robitussin. Follow-up visit if symptoms do not soon improve and resolve.   Discussed and recommended ER visit should significant shortness of breath develop. Discussed that he does not need to perform further COVID testing. The patient indicates understanding of these issues and agrees to the plan. The patient is asked to return as needed.     Cristobal Claude, MD  9/11/2023  1:21 PM

## 2023-09-27 ENCOUNTER — TELEPHONE (OUTPATIENT)
Dept: PULMONOLOGY | Facility: CLINIC | Age: 83
End: 2023-09-27

## 2023-09-27 NOTE — TELEPHONE ENCOUNTER
Patient tested positive for COVID on 9/1/23. He is asking for direction on when he should receive his COVID booster, flu vaccine and RSV?

## 2023-09-27 NOTE — TELEPHONE ENCOUNTER
RN, please call the patient and let him know that he should get the flu vaccine and RSV at the end of October and the COVID booster in the beginning of December.

## 2023-09-27 NOTE — TELEPHONE ENCOUNTER
----- Message from Nena Dominique sent at 9/25/2023 10:02 PM CDT -----  Regarding: Covid  Contact: [phone]  My wife and I tested positive for Covid on September 1, 2023. We reached our primary care docs and were placed on Paxlovid for the five day regimen. Since then we have stayed away from people and masked when ever we go out. I have a question regarding RSV immunization and a regular flu shot which I do every Fall. When should I get these two shots. I am also due for a Covid 19 booster at the end of November, which I believe I should receive. I'd like a professional opinion on all three of these immunizations. Thanks and have a healthy day!     praneeth

## 2023-09-29 ENCOUNTER — OFFICE VISIT (OUTPATIENT)
Dept: PODIATRY CLINIC | Facility: CLINIC | Age: 83
End: 2023-09-29

## 2023-09-29 DIAGNOSIS — L60.2 ONYCHOGRYPHOSIS: ICD-10-CM

## 2023-09-29 DIAGNOSIS — B35.1 ONYCHOMYCOSIS: Primary | ICD-10-CM

## 2023-09-29 PROCEDURE — 99213 OFFICE O/P EST LOW 20 MIN: CPT | Performed by: PODIATRIST

## 2023-10-02 ENCOUNTER — HOSPITAL ENCOUNTER (OUTPATIENT)
Dept: RESPIRATORY THERAPY | Facility: HOSPITAL | Age: 83
End: 2023-10-02
Attending: INTERNAL MEDICINE
Payer: MEDICARE

## 2023-10-02 ENCOUNTER — HOSPITAL ENCOUNTER (OUTPATIENT)
Dept: CT IMAGING | Facility: HOSPITAL | Age: 83
Discharge: HOME | End: 2023-10-02
Attending: INTERNAL MEDICINE
Payer: MEDICARE

## 2023-10-02 DIAGNOSIS — J84.9 ILD (INTERSTITIAL LUNG DISEASE) (HCC): ICD-10-CM

## 2023-10-02 DIAGNOSIS — J43.2 CENTRILOBULAR EMPHYSEMA (HCC): ICD-10-CM

## 2023-10-02 PROCEDURE — 71250 CT THORAX DX C-: CPT | Performed by: INTERNAL MEDICINE

## 2023-10-05 ENCOUNTER — TELEPHONE (OUTPATIENT)
Dept: PULMONOLOGY | Facility: CLINIC | Age: 83
End: 2023-10-05

## 2023-10-05 ENCOUNTER — HOSPITAL ENCOUNTER (OUTPATIENT)
Dept: RESPIRATORY THERAPY | Facility: HOSPITAL | Age: 83
Discharge: HOME | End: 2023-10-05
Attending: INTERNAL MEDICINE
Payer: MEDICARE

## 2023-10-05 DIAGNOSIS — U09.9 POST COVID-19 CONDITION, UNSPECIFIED: Primary | ICD-10-CM

## 2023-10-05 DIAGNOSIS — J84.9 ILD (INTERSTITIAL LUNG DISEASE) (HCC): ICD-10-CM

## 2023-10-05 DIAGNOSIS — R06.02 SHORTNESS OF BREATH: ICD-10-CM

## 2023-10-05 DIAGNOSIS — J43.2 CENTRILOBULAR EMPHYSEMA (HCC): ICD-10-CM

## 2023-10-05 PROCEDURE — 94729 DIFFUSING CAPACITY: CPT | Performed by: INTERNAL MEDICINE

## 2023-10-05 PROCEDURE — 94726 PLETHYSMOGRAPHY LUNG VOLUMES: CPT | Performed by: INTERNAL MEDICINE

## 2023-10-05 PROCEDURE — 94010 BREATHING CAPACITY TEST: CPT | Performed by: INTERNAL MEDICINE

## 2023-10-05 NOTE — TELEPHONE ENCOUNTER
Dr. Ryan Estimable- this is a previous patient of Dr. Deja Villanueva, he has an appointment scheduled with you in January to transition care. Patient had completed pulmonary rehab back in May but is requesting additional sessions after having had COVID. Please sign pending order if agreeable.

## 2023-10-05 NOTE — TELEPHONE ENCOUNTER
Ac CAPELLAN  Pulmo Clinical Staff (supporting Jayla Pittman MD)   10/5/23 10:07 AM  Could you also see if I could get back in Pulmonary Therapy sessions in the [facility]? I finished a session, but with the Covid, I think I could use some therapy. Thanks, and have a great day. You've been quite helpful!

## 2023-10-06 NOTE — PROCEDURES
[facility]     Pulmonary Function Test     Lorena Hdz Patient Status:  Outpatient    12/3/1940 MRN [id number]   Date of Exam 10/5/23 PCP Rashmi Ferris MD           Spirometry   FEV1: 1.79 75%  FVC: 2.26 71%  FEV1/FVC: 0.79    Lung Volume   TLC: 3.15 52%  RV : 0.86 36%    Diffusion Capacity   DLCO: 7.7 36%    Flow Volume Loop       Impression   No significant obstructive defect seen. Unable to comment on postbronchodilator respons since not performed. Moderate restrictive defect seen. Decreased lung volumes. Severe reduction in diffusion capacity.     Kristie Woodson DO  Pulmonary [address] [place] [address]

## 2024-01-02 ENCOUNTER — OFFICE VISIT (OUTPATIENT)
Dept: PULMONOLOGY | Facility: CLINIC | Age: 84
End: 2024-01-02
Payer: MEDICARE

## 2024-01-02 VITALS
WEIGHT: 146.19 LBS | HEIGHT: 66 IN | HEART RATE: 77 BPM | BODY MASS INDEX: 23.49 KG/M2 | DIASTOLIC BLOOD PRESSURE: 71 MMHG | RESPIRATION RATE: 20 BRPM | SYSTOLIC BLOOD PRESSURE: 156 MMHG | OXYGEN SATURATION: 96 %

## 2024-01-02 DIAGNOSIS — J84.9 ILD (INTERSTITIAL LUNG DISEASE) (HCC): Primary | ICD-10-CM

## 2024-01-02 PROCEDURE — 1126F AMNT PAIN NOTED NONE PRSNT: CPT | Performed by: INTERNAL MEDICINE

## 2024-01-02 PROCEDURE — 99213 OFFICE O/P EST LOW 20 MIN: CPT | Performed by: INTERNAL MEDICINE

## 2024-01-02 NOTE — PROGRESS NOTES
The patient is an 83-year-old male who I am meeting now for the first time to assume care from Dr. Nino Sarkar.  The patient has both interstitial lung disease with peripheral and basilar predominance as well as underlying COPD.  He had smoked 1 pack/day having quit in 1992.  He remains on Stiolto and albuterol and is working as an  at the age of 83.  He notes that he is breathing relatively comfortably.  He has had high-resolution CT scanning and pulmonary function testing in October 2022 and 2023 and they were stable.  He has deferred ILD specific therapies such as pirfenidone and Ofev.  He was started on nocturnal supplemental oxygen after a nocturnal oximetry was performed.  He questions whether or not he still needs.    Review of Systems:  Vision normal. Ear nose and throat normal. Bowel normal. Bladder function normal. No depression. No thyroid disease. No lymphatic system concerns.  No rash. Muscles and joints unremarkable. No weight loss no weight gain.    Physical Examination:  Vital signs normal. HEENT examination is unremarkable with pupils equal round and reactive to light and accommodation. Neck without adenopathy, thyromegaly, JVD nor bruit. Lungs diminished with dry bibasilar crackles right greater than left to auscultation and percussion. Cardiac regular rate and rhythm no murmur. Abdomen nontender, without hepatosplenomegaly and no mass appreciable. Extremities and Musculoskeletal without clubbing cyanosis nor edema, and mobility acceptable. Neurologic grossly intact with symmetric tone and strength and reflex.    Assessment and plan:  1.  Chronic respiratory disease-overlap of ILD and COPD.  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  Currently doing well clinically.  Defers ILD specific therapeutics.  No evidence of progression over the last year with serial high-res CT and PFTs.    Recommendations: Conservative management, patient is up-to-date with all  vaccinations, continue Stiolto and albuterol, contact me immediately if any new respiratory problems, see me again in the office close to October and we can set up for serial imaging and PFT.  Patient to pursue pulmonary rehab maintenance program.

## 2024-01-03 ENCOUNTER — TELEPHONE (OUTPATIENT)
Dept: PULMONOLOGY | Facility: CLINIC | Age: 84
End: 2024-01-03

## 2024-01-03 NOTE — TELEPHONE ENCOUNTER
Per last office visit note: patient to pursue pulmonary rehab maintenance program.     Pulmonary rehab referral form placed in Dr. Dotson's folder for signature.

## 2024-01-05 ENCOUNTER — TELEPHONE (OUTPATIENT)
Dept: PULMONOLOGY | Facility: CLINIC | Age: 84
End: 2024-01-05

## 2024-01-05 NOTE — TELEPHONE ENCOUNTER
Face sheet, DME order (overnight oximetry), and last office visit note faxed to [employer] [phone]. Fax confirmation received.

## 2024-01-05 NOTE — TELEPHONE ENCOUNTER
Pulmonary rehab referral form, last office visit, and PFT results faxed to pulmonary rehab [phone]. Fax confirmation received.     Referral form sent to [facility] for scanning.

## 2024-01-05 NOTE — ADDENDUM NOTE
Addended by: AKIKO KELLY on: 1/4/2024 06:26 PM     Modules accepted: Orders     Progress Note  [facility]        PATIENT DETAILS  Date: 8/21/2021   Francine Da Silva  83 year old female  [id number]  3107/[id number]   LOS: 1 day     Chief complaint:    Back pain    Subjective     C/o back pain this morning. Better than when she came in. Denies sob, no cp, no abd pain, no n/v.    ROS  12 point comprehensive ROS was assessed and negative except for what was mentioned in subjective.     VITALS  Vital Last Value 24 Hour Range   Temperature 97.8 °F (36.6 °C) Temp  Min: 97.4 °F (36.3 °C)  Max: 98.6 °F (37 °C)   Pulse 85 Pulse  Min: 71  Max: 99   Respiratory 18 Resp  Min: 16  Max: 18   Blood Pressure (!) 190/72 BP  Min: 142/78  Max: 190/72   Pulse Oximetry 99 % SpO2  Min: 95 %  Max: 99 %   CVP   No data recorded     Vital Today Admit   Weight 63.7 kg Weight: 65.2 kg   Height N/A Height: 5' 5\" (165.1 cm)   BMI N/A BMI (Calculated): 23.37     BMI  Body mass index is 23.37 kg/m².     PHYSICAL EXAM  General - Patient is in no acute distress.  Patient is conversing freely in full sentences. Telida  Eyes - Pupils equally round and reactive to light. Sclerae are anicteric  ENT - . Oropharyngeal mucous membranes are moist. Neck is soft and supple  LYMPHNODES - No cervical or axillary lymphadenopathy   MUSCULOSKELETAL - Warm and well perfused. No cyanosis or edema.  SKIN- No rashes or lesions.  CV Regular rate and rhythm without additional heartsounds.  No carotid bruits. Peripheral pulses are 2+ and symmetric.  PULM - CTAB w/o wheezing, rhonchi or rales   ABDOMEN Soft, non-tender and non-distended. Bowel sounds are normoactive. No guarding or rebound tenderness.  NEURO CNs II-XII are intact.  Strength, sensation, and coordination of RLE/RUE/LLE/LUEs are grossly intact.  PSYCH Alert and oriented to person, place and time. Recent and remote memory intact.    LABS  .  Recent Labs   Lab 08/17/21  1205 08/17/21  1154   WBC  --  8.1   HCT  --  38.6   HGB  --  13.1   PLT  --  249    SODIUM  --  134*   POTASSIUM  --  3.5   CHLORIDE  --  97*   CO2  --  28   GLUCOSE  --  98   BUN  --  10   CREATININE 0.50* 0.61   CALCIUM  --  8.9   ALBUMIN  --  4.0   BILIRUBIN  --  0.6   ALKPT  --  63   AST  --  15   GPT  --  16   LIPA  --  146     No results found     IVF      RADS      ASSESSMENT AND PLAN    Compression fracture of T11:  - s/p kyphoplasty  - continue with pain control  - PT/OT following.      HTN:  - BP still above target  - continue lisinopril and add Amlodipine    Disposition:  JENNIFER vs BIANCA with C pending progress with PT/OT        Una Gonzales MD  Hospitalist - Internal Medicine   Pager - [phone]

## 2024-01-08 NOTE — TELEPHONE ENCOUNTER
Spoke with Ciara at [facility]. Per Ciara- they did receive order for overnight oximetry and will email department to contact patient.

## 2024-01-08 NOTE — TELEPHONE ENCOUNTER
Elizabeth/LITZY states that she is waiting for a call back from pt to schedule test.  Call if any further questions.

## 2024-01-12 ENCOUNTER — PATIENT MESSAGE (OUTPATIENT)
Dept: PULMONOLOGY | Facility: CLINIC | Age: 84
End: 2024-01-12

## 2024-01-12 ENCOUNTER — OFFICE VISIT (OUTPATIENT)
Dept: INTERNAL MEDICINE CLINIC | Facility: CLINIC | Age: 84
End: 2024-01-12
Payer: MEDICARE

## 2024-01-12 VITALS
HEART RATE: 83 BPM | WEIGHT: 147.63 LBS | OXYGEN SATURATION: 91 % | HEIGHT: 66 IN | BODY MASS INDEX: 23.72 KG/M2 | SYSTOLIC BLOOD PRESSURE: 118 MMHG | DIASTOLIC BLOOD PRESSURE: 67 MMHG

## 2024-01-12 DIAGNOSIS — J84.9 ILD (INTERSTITIAL LUNG DISEASE) (HCC): Primary | ICD-10-CM

## 2024-01-12 DIAGNOSIS — E78.5 DYSLIPIDEMIA: ICD-10-CM

## 2024-01-12 DIAGNOSIS — N18.30 STAGE 3 CHRONIC KIDNEY DISEASE, UNSPECIFIED WHETHER STAGE 3A OR 3B CKD (HCC): ICD-10-CM

## 2024-01-12 PROCEDURE — 99213 OFFICE O/P EST LOW 20 MIN: CPT | Performed by: INTERNAL MEDICINE

## 2024-01-12 PROCEDURE — 1125F AMNT PAIN NOTED PAIN PRSNT: CPT | Performed by: INTERNAL MEDICINE

## 2024-01-12 NOTE — PROGRESS NOTES
Freeman Dominique is a 83 year old male.   Chief Complaint   Patient presents with    Follow - Up     Wants to know if he can get labs done before the Medicare physical      HPI:   Mr. Dominique presents this morning for follow-up of chronic medical conditions.  He is not eligible for a Medicare physical until next month, and will schedule a physical then.  He does request that labs for his physical be ordered now.    His previous Pulmonologist Dr. Sarkar has left the area and he recently established with Dr. Dotson.  Respiratory symptoms are stable with mild exertional dyspnea unchanged.  He is on supplemental O2 at night and will have pulse oximetry done to determine whether he still needs supplemental oxygen.  He also will begin pulmonary rehab.  No fever.  No cough.    He does admit that he is under some increased stress recently as his brother-in-law recently had a lung transplant.  He has otherwise been feeling well.    Medications reviewed, as listed below.  Received influenza vaccine, updated COVID booster and RSV vaccine this season.  Current Outpatient Medications   Medication Sig Dispense Refill    Multiple Vitamins-Minerals (PRESERVISION AREDS 2+MULTI VIT OR)       Tiotropium Bromide-Olodaterol 2.5-2.5 MCG/ACT Inhalation Aero Soln Inhale 2 puffs into the lungs daily. 3 each 3    albuterol 108 (90 Base) MCG/ACT Inhalation Aero Soln Inhale 2 puffs into the lungs every 6 (six) hours as needed for Wheezing. inhale 2 puff by inhalation route  every 4 - 6 hours as needed 3 each 4    MULTIVITAMIN TAB/CAP Take 1 tablet by mouth daily.       Allergies   Allergen Reactions    Molds & Smuts Runny nose    Penicillins HIVES      Past Medical History:   Diagnosis Date    Aortic atherosclerosis (HCC)     CXR 9-11    Stubbs esophagus 09/2016    EGD 9-16 with large paraesophageal hiatal hernia, Stubbs's esophagus, biopsy negative for dysplasia    BCC (basal cell carcinoma of skin) 2014    scalp    Carcinoma in situ of colon  2016    transverse    Chronic kidney disease, stage 3 (HCC)     Diverticulosis of large intestine     Dyslipidemia     GERD (gastroesophageal reflux disease)     Hiatal hernia     Moderate    Hypercholesterolemia 2016    ILD (interstitial lung disease) ([facility])     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; PFTs 10- with severe restriction with TLC 3.08 L (51%)    Iron deficiency anemia 2016    Melanoma in situ of cheek (HCC) 2014    Paraesophageal hiatal hernia     Pneumonia 1985     Past Surgical History:   Procedure Laterality Date    APPENDECTOMY      CATARACT EXTRACTION Bilateral      COLONOSCOPY N/A 11/10/2018    Procedure: COLONOSCOPY;  Surgeon: David Gongora MD;  Location: [facility] ENDOSCOPY    COLONOSCOPY N/A 2021    Procedure: COLONOSCOPY;  Surgeon: David Gongora MD;  Location: [facility] ENDOSCOPY    COLONOSCOPY & POLYPECTOMY  9/15/16    EGD  9/15/16    INGUINAL HERNIA REPAIR Right     LAPAROSCOPY, SURGICAL; COLECTOMY, PARTIAL, W/ ANASTOMOS Right 2016    REPAIR RECURR INGUIN RICKY,REDUCIBL Right     With right orchiectomy    SKIN SURGERY  2014    Excision melanoma in situ right cheek and BCCa scalp    TONSILLECTOMY        Social History:  Social History     Socioeconomic History    Marital status:     Number of children: 1   Occupational History    Occupation:    Tobacco Use    Smoking status: Former     Packs/day: 1.00     Years: 36.00     Additional pack years: 0.00     Total pack years: 36.00     Types: Cigarettes     Start date:      Quit date: 1992     Years since quittin.5    Smokeless tobacco: Never    Tobacco comments:     Started at age 17   Vaping Use    Vaping Use: Never used   Substance and Sexual Activity    Alcohol use: Yes     Alcohol/week: 0.0 standard drinks of alcohol     Comment: Infrequent    Drug use: No   Other Topics Concern    Pt has a pacemaker No    Pt has a defibrillator No    Reaction to local anesthetic No     Caffeine Concern Yes     Comment: Coffee 3 cups daily   Social History Narrative    Lives with wife near hospital    No pets    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 ILD (interstitial lung disease) (HCC)  Clinically stable with Pulmonary follow-up.  Medicare physical in February.  CMP CBC and lipid profile ordered today.    2. Dyslipidemia  Await labs  - Lipid Panel; Future    3. Stage 3 chronic kidney disease, unspecified whether stage 3a or 3b CKD (HCC)  Await labs  - Comp Metabolic Panel (14); Future  - CBC, Platelet; No Differential; Future      The patient indicates understanding of these issues and agrees to the plan.  The patient is asked to return in February.    Nino Daley MD  1/12/2024  11:10 AM

## 2024-01-15 NOTE — TELEPHONE ENCOUNTER
From: Praneeth Dominique  To: Piotr Dotson  Sent: 1/12/2024 5:03 PM CST  Subject: Pulmonary rehab    Thank you for the referral to Pulmonary Cardio rehab. I was called by the unit and told that the Pulmonary Maintenance portion was not in operation at this time. Could you change the order so that I can get into the Stage 2 portion of the program?    Thanks,   praneeth

## 2024-01-15 NOTE — TELEPHONE ENCOUNTER
Reference 1/3/24 telephone encounter, pulmonary rehab order faxed to [facility] pulmonary rehab department on 1/5/24.    Placed call to [facility] pulmonary rehab who advised order received is for phase 2 and new order is not needed at this time, patient just needs to call to schedule.

## 2024-01-31 ENCOUNTER — TELEPHONE (OUTPATIENT)
Dept: PULMONOLOGY | Facility: CLINIC | Age: 84
End: 2024-01-31

## 2024-01-31 NOTE — TELEPHONE ENCOUNTER
Received fax from [facility] with Overnight Oximetry report. Placed in Dr Dotson's folder for review.

## 2024-02-01 ENCOUNTER — OFFICE VISIT (OUTPATIENT)
Dept: DERMATOLOGY CLINIC | Facility: CLINIC | Age: 84
End: 2024-02-01
Payer: MEDICARE

## 2024-02-01 DIAGNOSIS — D22.9 MULTIPLE NEVI: ICD-10-CM

## 2024-02-01 DIAGNOSIS — Z85.820 PERSONAL HISTORY OF MALIGNANT MELANOMA OF SKIN: ICD-10-CM

## 2024-02-01 DIAGNOSIS — L81.4 LENTIGO: ICD-10-CM

## 2024-02-01 DIAGNOSIS — D23.9 BENIGN NEOPLASM OF SKIN, UNSPECIFIED LOCATION: ICD-10-CM

## 2024-02-01 DIAGNOSIS — Z85.828 FOLLOW-UP SURVEILLANCE OF SKIN CANCER, ENCOUNTER FOR: ICD-10-CM

## 2024-02-01 DIAGNOSIS — Z08 FOLLOW-UP SURVEILLANCE OF SKIN CANCER, ENCOUNTER FOR: ICD-10-CM

## 2024-02-01 DIAGNOSIS — L82.1 SK (SEBORRHEIC KERATOSIS): ICD-10-CM

## 2024-02-01 DIAGNOSIS — Z85.828 HISTORY OF NONMELANOMA SKIN CANCER: ICD-10-CM

## 2024-02-01 DIAGNOSIS — L57.0 AK (ACTINIC KERATOSIS): Primary | ICD-10-CM

## 2024-02-01 PROCEDURE — 99213 OFFICE O/P EST LOW 20 MIN: CPT | Performed by: DERMATOLOGY

## 2024-02-01 PROCEDURE — 17003 DESTRUCT PREMALG LES 2-14: CPT | Performed by: DERMATOLOGY

## 2024-02-01 PROCEDURE — 17000 DESTRUCT PREMALG LESION: CPT | Performed by: DERMATOLOGY

## 2024-02-06 ENCOUNTER — TELEPHONE (OUTPATIENT)
Dept: PULMONOLOGY | Facility: CLINIC | Age: 84
End: 2024-02-06

## 2024-02-07 NOTE — TELEPHONE ENCOUNTER
I left a message for the patient to call me back regarding the results of his overnight oximetry during which she desaturated to 88% or less for 15 minutes but otherwise was well saturated for the vast majority of the night.

## 2024-02-09 ENCOUNTER — ORDER TRANSCRIPTION (OUTPATIENT)
Dept: CARDIAC REHAB | Facility: HOSPITAL | Age: 84
End: 2024-02-09

## 2024-02-09 ENCOUNTER — LAB ENCOUNTER (OUTPATIENT)
Dept: LAB | Facility: HOSPITAL | Age: 84
End: 2024-02-09
Attending: INTERNAL MEDICINE
Payer: MEDICARE

## 2024-02-09 DIAGNOSIS — N18.30 STAGE 3 CHRONIC KIDNEY DISEASE, UNSPECIFIED WHETHER STAGE 3A OR 3B CKD (HCC): ICD-10-CM

## 2024-02-09 DIAGNOSIS — E78.5 DYSLIPIDEMIA: ICD-10-CM

## 2024-02-09 DIAGNOSIS — J84.9 INTERSTITIAL LUNG DISEASE (HCC): Primary | ICD-10-CM

## 2024-02-09 LAB
ALBUMIN SERPL-MCNC: 4 G/DL (ref 3.2–4.8)
ALBUMIN/GLOB SERPL: 1.4 {RATIO} (ref 1–2)
ALP LIVER SERPL-CCNC: 78 U/L
ALT SERPL-CCNC: 13 U/L
ANION GAP SERPL CALC-SCNC: 7 MMOL/L (ref 0–18)
AST SERPL-CCNC: 25 U/L (ref ?–34)
BILIRUB SERPL-MCNC: 0.6 MG/DL (ref 0.2–1.1)
BUN BLD-MCNC: 18 MG/DL (ref 9–23)
BUN/CREAT SERPL: 13.2 (ref 10–20)
CALCIUM BLD-MCNC: 9.2 MG/DL (ref 8.7–10.4)
CHLORIDE SERPL-SCNC: 106 MMOL/L (ref 98–112)
CHOLEST SERPL-MCNC: 178 MG/DL (ref ?–200)
CO2 SERPL-SCNC: 27 MMOL/L (ref 21–32)
CREAT BLD-MCNC: 1.36 MG/DL
DEPRECATED RDW RBC AUTO: 42.8 FL (ref 35.1–46.3)
EGFRCR SERPLBLD CKD-EPI 2021: 52 ML/MIN/1.73M2 (ref 60–?)
ERYTHROCYTE [DISTWIDTH] IN BLOOD BY AUTOMATED COUNT: 13.2 % (ref 11–15)
FASTING PATIENT LIPID ANSWER: YES
FASTING STATUS PATIENT QL REPORTED: YES
GLOBULIN PLAS-MCNC: 2.8 G/DL (ref 2.8–4.4)
GLUCOSE BLD-MCNC: 98 MG/DL (ref 70–99)
HCT VFR BLD AUTO: 42.7 %
HDLC SERPL-MCNC: 38 MG/DL (ref 40–59)
HGB BLD-MCNC: 13.9 G/DL
LDLC SERPL CALC-MCNC: 117 MG/DL (ref ?–100)
MCH RBC QN AUTO: 28.8 PG (ref 26–34)
MCHC RBC AUTO-ENTMCNC: 32.6 G/DL (ref 31–37)
MCV RBC AUTO: 88.4 FL
NONHDLC SERPL-MCNC: 140 MG/DL (ref ?–130)
OSMOLALITY SERPL CALC.SUM OF ELEC: 292 MOSM/KG (ref 275–295)
PLATELET # BLD AUTO: 251 10(3)UL (ref 150–450)
POTASSIUM SERPL-SCNC: 4.2 MMOL/L (ref 3.5–5.1)
PROT SERPL-MCNC: 6.8 G/DL (ref 5.7–8.2)
RBC # BLD AUTO: 4.83 X10(6)UL
SODIUM SERPL-SCNC: 140 MMOL/L (ref 136–145)
TRIGL SERPL-MCNC: 127 MG/DL (ref 30–149)
VLDLC SERPL CALC-MCNC: 22 MG/DL (ref 0–30)
WBC # BLD AUTO: 6.9 X10(3) UL (ref 4–11)

## 2024-02-09 PROCEDURE — 80061 LIPID PANEL: CPT

## 2024-02-09 PROCEDURE — 85027 COMPLETE CBC AUTOMATED: CPT

## 2024-02-09 PROCEDURE — 36415 COLL VENOUS BLD VENIPUNCTURE: CPT

## 2024-02-09 PROCEDURE — 80053 COMPREHEN METABOLIC PANEL: CPT

## 2024-02-12 NOTE — PROGRESS NOTES
Freeman Dominique is a 83 year old male.  HPI:     CC:    Chief Complaint   Patient presents with    Full Skin Exam     Hx of Ak, BCC, and melanoma.  LOV 1/2022.  Pt presents for full body exam.  Lesion of concern to the right lower abdomen, denies bleeding or pain.          Allergies:  Molds & smuts and Penicillins    HISTORY:    Past Medical History:   Diagnosis Date    Aortic atherosclerosis (HCC)     CXR 9-11    Stubbs esophagus 09/2016    EGD 9-16 with large paraesophageal hiatal hernia, Stubbs's esophagus, biopsy negative for dysplasia    BCC (basal cell carcinoma of skin) 2014    scalp    Carcinoma in situ of colon 09/2016    transverse    Chronic kidney disease, stage 3 (HCC)     Diverticulosis of large intestine     Dyslipidemia     GERD (gastroesophageal reflux disease)     Hiatal hernia     Moderate    Hypercholesterolemia 06/22/2016    ILD (interstitial lung disease) (HCC)     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; PFTs 10-22 with severe restriction with TLC 3.08 L (51%)    Iron deficiency anemia 06/2016    Melanoma in situ of cheek (HCC) 07/2014    Paraesophageal hiatal hernia     Pneumonia 1985      Past Surgical History:   Procedure Laterality Date    APPENDECTOMY  1956    CATARACT EXTRACTION Bilateral  2015    COLONOSCOPY N/A 11/10/2018    Procedure: COLONOSCOPY;  Surgeon: David Gongora MD;  Location: [facility] ENDOSCOPY    COLONOSCOPY N/A 11/11/2021    Procedure: COLONOSCOPY;  Surgeon: David Gongora MD;  Location: [facility] ENDOSCOPY    COLONOSCOPY & POLYPECTOMY  9/15/16    EGD  9/15/16    INGUINAL HERNIA REPAIR Right 1955    LAPAROSCOPY, SURGICAL; COLECTOMY, PARTIAL, W/ ANASTOMOS Right October 2016    REPAIR RECURR INGUIN RICKY,REDUCIBL Right 1975    With right orchiectomy    SKIN SURGERY  July 2014    Excision melanoma in situ right cheek and BCCa scalp    TONSILLECTOMY  1948      Family History   Problem Relation Age of Onset    Breast Cancer Mother 70        Metastatic to brain    Other (COPD) Father     Breast  Cancer Sister 50    Other (COPD) Sister     Hypertension Brother     Diabetes Brother       Social History     Socioeconomic History    Marital status:     Number of children: 1   Occupational History    Occupation:    Tobacco Use    Smoking status: Former     Packs/day: 1.00     Years: 36.00     Additional pack years: 0.00     Total pack years: 36.00     Types: Cigarettes     Start date:      Quit date: 1992     Years since quittin.6    Smokeless tobacco: Never    Tobacco comments:     Started at age 17   Vaping Use    Vaping Use: Never used   Substance and Sexual Activity    Alcohol use: Yes     Alcohol/week: 0.0 standard drinks of alcohol     Comment: Infrequent    Drug use: No   Other Topics Concern    Grew up on a farm No    History of tanning Yes    Outdoor occupation No    Pt has a pacemaker No    Pt has a defibrillator No    Reaction to local anesthetic No    Caffeine Concern Yes     Comment: Coffee 3 cups daily   Social History Narrative    Lives with wife near hospital    No pets    Work [employer]    - [employer]            Current Outpatient Medications   Medication Sig Dispense Refill    Multiple Vitamins-Minerals (PRESERVISION AREDS 2+MULTI VIT OR)       Tiotropium Bromide-Olodaterol 2.5-2.5 MCG/ACT Inhalation Aero Soln Inhale 2 puffs into the lungs daily. 3 each 3    albuterol 108 (90 Base) MCG/ACT Inhalation Aero Soln Inhale 2 puffs into the lungs every 6 (six) hours as needed for Wheezing. inhale 2 puff by inhalation route  every 4 - 6 hours as needed 3 each 4    MULTIVITAMIN TAB/CAP Take 1 tablet by mouth daily.       Allergies:   Allergies   Allergen Reactions    Molds & Smuts Runny nose    Penicillins HIVES       Past Medical History:   Diagnosis Date    Aortic atherosclerosis (HCC)     CXR     Stubbs esophagus 2016    EGD  with large paraesophageal hiatal hernia, Stubbs's esophagus, biopsy negative for dysplasia    BCC (basal cell carcinoma  of skin)     scalp    Carcinoma in situ of colon 2016    transverse    Chronic kidney disease, stage 3 (HCC)     Diverticulosis of large intestine     Dyslipidemia     GERD (gastroesophageal reflux disease)     Hiatal hernia     Moderate    Hypercholesterolemia 2016    ILD (interstitial lung disease) (HCC)     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; PFTs 10-22 with severe restriction with TLC 3.08 L (51%)    Iron deficiency anemia 2016    Melanoma in situ of cheek (HCC) 2014    Paraesophageal hiatal hernia     Pneumonia      Past Surgical History:   Procedure Laterality Date    APPENDECTOMY      CATARACT EXTRACTION Bilateral      COLONOSCOPY N/A 11/10/2018    Procedure: COLONOSCOPY;  Surgeon: David Gongora MD;  Location: [facility] ENDOSCOPY    COLONOSCOPY N/A 2021    Procedure: COLONOSCOPY;  Surgeon: David Gongora MD;  Location: [facility] ENDOSCOPY    COLONOSCOPY & POLYPECTOMY  9/15/16    EGD  9/15/16    INGUINAL HERNIA REPAIR Right     LAPAROSCOPY, SURGICAL; COLECTOMY, PARTIAL, W/ ANASTOMOS Right 2016    REPAIR RECURR INGUIN RICKY,REDUCIBL Right     With right orchiectomy    SKIN SURGERY  2014    Excision melanoma in situ right cheek and BCCa scalp    TONSILLECTOMY  89     Social History     Socioeconomic History    Marital status:      Spouse name: Not on file    Number of children: 1    Years of education: Not on file    Highest education level: Not on file   Occupational History    Occupation:    Tobacco Use    Smoking status: Former     Packs/day: 1.00     Years: 36.00     Additional pack years: 0.00     Total pack years: 36.00     Types: Cigarettes     Start date:      Quit date: 1992     Years since quittin.6    Smokeless tobacco: Never    Tobacco comments:     Started at age 17   Vaping Use    Vaping Use: Never used   Substance and Sexual Activity    Alcohol use: Yes     Alcohol/week: 0.0 standard drinks of alcohol     Comment: Infrequent     Drug use: No    Sexual activity: Not on file   Other Topics Concern    Grew up on a farm No    History of tanning Yes    Outdoor occupation No    Pt has a pacemaker No    Pt has a defibrillator No    Reaction to local anesthetic No     Service Not Asked    Blood Transfusions Not Asked    Caffeine Concern Yes     Comment: Coffee 3 cups daily    Occupational Exposure Not Asked    Hobby Hazards Not Asked    Sleep Concern Not Asked    Stress Concern Not Asked    Weight Concern Not Asked    Special Diet Not Asked    Back Care Not Asked    Exercise Not Asked    Bike Helmet Not Asked    Seat Belt Not Asked    Self-Exams Not Asked   Social History Narrative    Lives with wife near hospital    No pets    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    HPI:  Chief Complaint   Patient presents with    Full Skin Exam     Hx of Ak, BCC, and melanoma.  LOV 1/2022.  Pt presents for full body exam.  Lesion of concern to the right lower abdomen, denies bleeding or pain.      Follow-up history of AK's BCC melanoma.  Has noted scaly areas on the scalp.  Not as consistent as he should be with sunblock.  Still working on project in [place].  Does always wear hat      Melanoma in situ right cheek 2014    Frontal scalp BCC 2014    No other lesions of concern presently  Patient presents with concerns above.    Patient has been in their usual state of health.     Past notes/ records and  appropriate/relevant lab results including pathology and past body maps reviewed. Including outside notes/ PCP notes as appropriate. Updated and new information noted in current visit.     ROS:  Denies other relevant systemic complaints.      History, medications, allergies reviewed as noted.       Physical Examination:     Well-developed well-nourished patient alert oriented in no acute distress.  Exam performed, including scalp, head, neck, face,nails, hair, external eyes, including conjunctival mucosa, eyelids, lips external ears , arms, digits,palms.     Multiple light to medium brown, well marginated, uniformly pigmented, macules and papules 6 mm and less scattered on exam. pigmented lesions examined with dermoscopy benign-appearing patterns.     Waxy tannish keratotic papules scattered, cherry-red vascular papules scattered.    See map today's date for lesions noted .  See assessment and plan below for specific lesions.    Otherwise remarkable for lesions as noted on map.    See A/P  below for additional information:    Assessment / plan:    No orders of the defined types were placed in this encounter.      Meds & Refills for this Visit:  Requested Prescriptions      No prescriptions requested or ordered in this encounter         Encounter Diagnoses   Name Primary?    AK (actinic keratosis) Yes    SK (seborrheic keratosis)     Lentigo     Benign neoplasm of skin, unspecified location     Multiple nevi     Personal history of malignant melanoma of skin     History of nonmelanoma skin cancer     Follow-up surveillance of skin cancer, encounter for          Erythematous scaling keratotic papules noted at   Vertex and anterior scalp  Actinic Keratoses.  Precancerous nature discussed. Sun protection, sunscreen/ blocks encouraged Lesions treated with cryo- .  Biopsy if not resolved.    X3 over the scalp    Repeat course of fluorouracil scalp  Consider 2 other areas over the temples helices forehead     Continue  careful sun protection hat.    Papule posterior neck observe.    Multiple waxy tan papules over the mid back   Waxy tan keratotic papules lesions in areas of concern as noted reassurance given.  Benign nature discussed.  Possibility of cryo, alphahydroxy acids over-the-counter retinol's discussed.      Extensive lentigines.      Patient with history of skin cancer.  No evidence of recurrence.    No new skin cancer.  No obvious adenopathy    No other susupicious lesions on todays  exam.    Please refer to map for specific lesions.  See additional diagnoses.  Pros cons of various therapies, risks benefits discussed.Pathophysiology discussed with patient.  Therapeutic options reviewed.  See  Medications in grid.  Instructions reviewed at length.    Benign nevi, seborrheic  keratoses, cherry angiomas:  Reassurance regarding other benign skin lesions.Signs and symptoms of skin cancer, ABCDE's of melanoma discussed with patient. Sunscreen use, sun protection, self exams reviewed.  Followup as noted RTC ---routine checkup    6 mos -one year or p.r.n.    Encounter Times   Including precharting, reviewing chart, prior notes obtaining history: 10 minutes, medical exam :10 minutes, notes on body map, plan, counseling 10minutes My total time spent caring for the patient on the day of the encounter: 30 minutes     The patient indicates understanding of these issues and agrees to the plan.  The patient is asked to return as noted in follow-up/ above.    This note was generated using Dragon voice recognition software.  Please contact me regarding any confusion resulting from errors in recognition..   Note to patient and family: The 21st Century Cures Act makes medical notes like these available to patients. However, be advised this is a medical document. It is intended as [id number] communication and monitoring of a patient's care needs. It is written in medical language and may contain abbreviations or verbiage that are  unfamiliar. It may appear blunt or direct. Medical documents are intended to carry relevant information, facts as evident and the clinical opinion of the practitioner.

## 2024-02-15 ENCOUNTER — CARDPULM VISIT (OUTPATIENT)
Dept: CARDIAC REHAB | Facility: HOSPITAL | Age: 84
End: 2024-02-15
Attending: INTERNAL MEDICINE
Payer: MEDICARE

## 2024-02-15 DIAGNOSIS — J84.9 INTERSTITIAL LUNG DISEASE (HCC): Primary | ICD-10-CM

## 2024-02-16 ENCOUNTER — OFFICE VISIT (OUTPATIENT)
Dept: INTERNAL MEDICINE CLINIC | Facility: CLINIC | Age: 84
End: 2024-02-16
Payer: MEDICARE

## 2024-02-16 VITALS
DIASTOLIC BLOOD PRESSURE: 70 MMHG | SYSTOLIC BLOOD PRESSURE: 112 MMHG | WEIGHT: 147.19 LBS | HEIGHT: 66 IN | BODY MASS INDEX: 23.65 KG/M2 | HEART RATE: 82 BPM

## 2024-02-16 DIAGNOSIS — K21.9 GASTROESOPHAGEAL REFLUX DISEASE, UNSPECIFIED WHETHER ESOPHAGITIS PRESENT: ICD-10-CM

## 2024-02-16 DIAGNOSIS — D50.8 OTHER IRON DEFICIENCY ANEMIA: ICD-10-CM

## 2024-02-16 DIAGNOSIS — K22.70 BARRETT'S ESOPHAGUS WITHOUT DYSPLASIA: ICD-10-CM

## 2024-02-16 DIAGNOSIS — J84.9 ILD (INTERSTITIAL LUNG DISEASE) (HCC): ICD-10-CM

## 2024-02-16 DIAGNOSIS — Z00.00 ENCOUNTER FOR ANNUAL HEALTH EXAMINATION: ICD-10-CM

## 2024-02-16 DIAGNOSIS — E78.5 DYSLIPIDEMIA: ICD-10-CM

## 2024-02-16 DIAGNOSIS — I70.0 AORTIC ATHEROSCLEROSIS (HCC): ICD-10-CM

## 2024-02-16 DIAGNOSIS — N18.30 STAGE 3 CHRONIC KIDNEY DISEASE, UNSPECIFIED WHETHER STAGE 3A OR 3B CKD (HCC): ICD-10-CM

## 2024-02-16 DIAGNOSIS — Z00.00 ANNUAL PHYSICAL EXAM: Primary | ICD-10-CM

## 2024-02-16 DIAGNOSIS — Z86.006 HISTORY OF MELANOMA IN SITU: ICD-10-CM

## 2024-02-16 PROCEDURE — G0439 PPPS, SUBSEQ VISIT: HCPCS | Performed by: INTERNAL MEDICINE

## 2024-02-16 PROCEDURE — 99213 OFFICE O/P EST LOW 20 MIN: CPT | Performed by: INTERNAL MEDICINE

## 2024-02-16 NOTE — PATIENT INSTRUCTIONS
Please continue regular visits with Pulmonary.  Continue healthy diet and regular walking.  Annual physical.  Freeman Dominique's SCREENING SCHEDULE   Tests on this list are recommended by your physician but may not be covered, or covered at this frequency, by your insurer.   Please check with your insurance carrier before scheduling to verify coverage.   PREVENTATIVE SERVICES FREQUENCY &  COVERAGE DETAILS LAST COMPLETION DATE   Diabetes Screening    Fasting Blood Sugar / Glucose    One screening every 12 months if never tested or if previously tested but not diagnosed with pre-diabetes   One screening every 6 months if diagnosed with pre-diabetes Lab Results   Component Value Date    GLU 98 02/09/2024        Cardiovascular Disease Screening    Lipid Panel  Cholesterol  Lipoprotein (HDL)  Triglycerides Covered every 5 years for all Medicare beneficiaries without apparent signs or symptoms of cardiovascular disease Lab Results   Component Value Date    CHOLEST 178 02/09/2024    HDL 38 (L) 02/09/2024     (H) 02/09/2024    TRIG 127 02/09/2024         Electrocardiogram (EKG)   Covered if needed at Welcome to Medicare, and non-screening if indicated for medical reasons 03/08/2022      Ultrasound Screening for Abdominal Aortic Aneurysm (AAA) Covered once in a lifetime for one of the following risk factors   • Men who are 65-75 years old and have ever smoked   • Anyone with a family history -     Colorectal Cancer Screening  Covered for ages 50-85; only need ONE of the following:    Colonoscopy   Covered every 10 years    Covered every 2 years if patient is at high risk or previous colonoscopy was abnormal 11/11/2021    No recommendations at this time    Flexible Sigmoidoscopy   Covered every 4 years -    Fecal Occult Blood Test Covered annually -   Prostate Cancer Screening    Prostate-Specific Antigen (PSA) Annually No results found for: \"PSA\"  There are no preventive care reminders to display for this patient.    Immunizations    Influenza Covered once per flu season  Please get every year 12/13/2023  No recommendations at this time    Pneumococcal Each vaccine ([id number] & [id number]) covered once after 65 Prevnar 13: 06/22/2016    [id number]: 01/21/2022     No recommendations at this time    Hepatitis B One screening covered for patients with certain risk factors   -  No recommendations at this time    Tetanus Toxoid Not covered by Medicare Part B unless medically necessary (cut with metal); may be covered with your pharmacy prescription benefits -    Tetanus, Diptheria and Pertusis TD and TDaP Not covered by Medicare Part B -  No recommendations at this time    Zoster Not covered by Medicare Part B; may be covered with your pharmacy  prescription benefits -  No recommendations at this time

## 2024-02-16 NOTE — PROGRESS NOTES
Subjective:   Freeman Dominique is a 83 year old male who presents this morning for a Medicare Subsequent Annual Wellness visit (Pt already had Initial Annual Wellness) and scheduled follow up of multiple significant but stable problems including interstitial lung disease, dyslipidemia and chronic kidney disease stage III.    His last Medicare physical was February 2023.  He feels well.  No particular issues for discussion today.  He continues to work as an .    In terms of his interstitial lung disease, he follows regularly with Pulmonary and symptoms have been stable.  He wears supplemental oxygen at night.  Diet healthy and he walks regularly.  Weight has been stable.    In February 2024, CMP normal except BUN 18 creatinine 1.36 (stable), CBC normal, cholesterol 178 triglycerides 127 HDL 38 .  Last PSA September 2011 normal at 1.8.  PFTs October 2022 with severe restriction with TLC 3.08 L (51% of predicted), with repeat PFTs October 2023 with moderate restriction.  Nuclear treadmill stress test October 2016 normal with EF 75%.  Echo November 2022 with mild LVH, EF 60%, mild AI.  Colonoscopy November 2021 with multiple polyps (hyperplastic), diverticulosis and internal hemorrhoids with further colonoscopies deferred.  EGD September 2016 revealed a large paraesophageal hiatal hernia and Stubbs's esophagus with biopsies negative for dysplasia.  Tdap vaccine May 2023.  Shingrix vaccine May and August 2023.  Pneumovax 2013 and January 2022.  Prevnar June 2016.  Influenza vaccine December 2023.  RSV vaccine November 2023.  Has received the most recent COVID booster.     History/Other:   Fall Risk Assessment:   He has been screened for Falls and is low risk.      Cognitive Assessment:   He had a completely normal cognitive assessment - see flowsheet entries     Functional Ability/Status:   Freeman Dominique has a completely normal functional assessment. See flowsheet for details.      Depression Screening  (PHQ-2/PHQ-9): PHQ-2 SCORE: 0  , done 2/15/2024   Last [facility] Suicide Screening on 2/16/2024 was No Risk.     Advanced Directives:   He does have a Living Will but we do NOT have it on file in Epic.    He has a Power of  for Health Care on file in [facility].  Not discussed      Patient Active Problem List   Diagnosis    Melanoma in situ of cheek (HCC)    GERD (gastroesophageal reflux disease)    Hypercholesterolemia    Iron deficiency anemia    Carcinoma in situ of colon    Colon polyps    Stubbs's esophagus    History of melanoma in situ    Actinic keratosis    Sun-damaged skin    Seborrheic dermatitis    Aortic atherosclerosis (HCC)    Stubbs esophagus    BCC (basal cell carcinoma of skin)    Diverticulosis of large intestine    Dyslipidemia    Paraesophageal hiatal hernia    History of basal cell carcinoma    Chronic kidney disease, stage 3 (HCC)    ILD (interstitial lung disease) ([facility])     Allergies:  He is allergic to molds & smuts and penicillins.    Current Medications:  Outpatient Medications Marked as Taking for the 2/16/24 encounter (Office Visit) with Nino Daley MD   Medication Sig    Multiple Vitamins-Minerals (PRESERVISION AREDS 2+MULTI VIT OR)     Tiotropium Bromide-Olodaterol 2.5-2.5 MCG/ACT Inhalation Aero Soln Inhale 2 puffs into the lungs daily.    albuterol 108 (90 Base) MCG/ACT Inhalation Aero Soln Inhale 2 puffs into the lungs every 6 (six) hours as needed for Wheezing. inhale 2 puff by inhalation route  every 4 - 6 hours as needed    MULTIVITAMIN TAB/CAP Take 1 tablet by mouth daily.       Medical History:  He  has a past medical history of Aortic atherosclerosis (HCC), Stubbs esophagus (09/2016), BCC (basal cell carcinoma of skin) (2014), Carcinoma in situ of colon (09/2016), Chronic kidney disease, stage 3 (HCC), Diverticulosis of large intestine, Dyslipidemia, GERD (gastroesophageal reflux disease), ILD (interstitial lung disease) (HCC), Iron deficiency anemia (06/2016),  Melanoma in situ of cheek (HCC) (2014), Paraesophageal hiatal hernia, and Pneumonia ().  Surgical History:  He  has a past surgical history that includes tonsillectomy (); Inguinal hernia repair (Right, ); appendectomy (); repair recurr inguin chapis,reducibl (Right, ); skin surgery (2014); Cataract extraction (Bilateral,  ); laparoscopy, surgical; colectomy, partial, w/ anastomos (Right, 2016); colonoscopy & polypectomy (9/15/16); egd (9/15/16); colonoscopy (N/A, 11/10/2018); and colonoscopy (N/A, 2021).   Family History:  His family history includes Breast Cancer (age of onset: 50) in his sister; Breast Cancer (age of onset: 70) in his mother; COPD in his father and sister; Diabetes in his brother; Hypertension in his brother.  Social History:  He  reports that he quit smoking about 31 years ago. His smoking use included cigarettes. He started smoking about 67 years ago. He has a 36 pack-year smoking history. He has never used smokeless tobacco. He reports current alcohol use. He reports that he does not use drugs.    Tobacco:  He smoked tobacco in the past but quit greater than 12 months ago.  Social History    Tobacco Use      Smoking status: Former        Packs/day: 1.00        Years: 36.00        Additional pack years: 0.00        Total pack years: 36.00        Types: Cigarettes        Start date:         Quit date: 1992        Years since quittin.6      Smokeless tobacco: Never      Tobacco comments: Started at age 17       CAGE Alcohol Screen:   CAGE screening score of 0 on 2/15/2024, showing low risk of alcohol abuse.      Patient Care Team:  Nino Daley MD as PCP - General (Internal Medicine)  Kelli Barakat RN as Nurse Navigator (ONCOLOGY)    Review of Systems    REVIEW OF SYSTEMS:   GENERAL: No fever  LUNGS: Chronic stable exertional dyspnea.  No cough or wheezing  CARDIAC: No lightheadedness palpitations or chest pain  GI: Occasional  heartburn.  No anorexia dysphagia nausea vomiting abdominal pain diarrhea constipation or rectal bleeding  : Occasionally weak urinary stream.  Occasional burning with urination though not currently.  No urinary frequency or hematuria  MUSCULOSKELETAL: No leg swelling  NEURO: Occasional mild headache    Objective:   Physical Exam    EXAM:   GENERAL: Pleasant male appearing well in no distress  /70 (BP Location: Right arm, Patient Position: Sitting, Cuff Size: adult)   Pulse 82   Ht 5' 6\" (1.676 m)   Wt 147 lb 3.2 oz (66.8 kg)   BMI 23.76 kg/m²   HEENT: Anicteric, conjunctiva pink, oropharynx normal  NECK: Supple without mass or thyromegaly  NODES: No peripheral adenopathy  LUNGS: Resonant to percussion, bibasilar inspiratory crackles about one quarter of the way up without wheezing  CARDIAC: Rhythm regular S1 S2 normal without murmur or edema  ABDOMEN: Bowel sounds normal soft nontender without mass or hepatosplenomegaly  EXTREMITIES: Normal without cyanosis or clubbing  PULSES: Carotid upstrokes 2+ without carotid bruits, distal pulses 1-2+ bilateral dorsalis pedis and posterior tibial  NEURO: Reflexes 2+ bilaterally and symmetric     /70 (BP Location: Right arm, Patient Position: Sitting, Cuff Size: adult)   Pulse 82   Ht 5' 6\" (1.676 m)   Wt 147 lb 3.2 oz (66.8 kg)   BMI 23.76 kg/m²  Estimated body mass index is 23.76 kg/m² as calculated from the following:    Height as of this encounter: 5' 6\" (1.676 m).    Weight as of this encounter: 147 lb 3.2 oz (66.8 kg).    Medicare Hearing Assessment:   Hearing Screening    Time taken: 2/16/2024 10:54 AM  Entry User: Rosemary Rojas LPN  Screening Method: Finger Rub  Finger Rub Result: Pass                     Assessment & Plan:   Freeman Dominique is a 83 year old male who presents for a Medicare Assessment.     1. Annual physical exam (Primary)  Reinforced healthy diet and regular physical activity.  Annual Medicare physical.    2. ILD (interstitial  lung disease) (HCC)  Stable symptoms with regular Pulmonary follow-up    3. Dyslipidemia  Low HDL.  Reinforced regular activity    4. Stage 3 chronic kidney disease, unspecified whether stage 3a or 3b CKD (HCC)  Stable.  Will continue to monitor.  Reinforced adequate hydration and avoidance of NSAIDs    5. Gastroesophageal reflux disease, unspecified whether esophagitis present   Only occasional symptoms    6. Stubbs's esophagus without dysplasia  Last EGD without dysplasia    7. Other iron deficiency anemia  Recent CBC normal    8. History of melanoma in situ  No recurrence.  Ongoing follow-up with Dermatology    9. Aortic atherosclerosis (HCC)  Asymptomatic.  Risk factor control  Overview:  CXR 9-11      The patient indicates understanding of these issues and agrees to the plan.  Reinforced healthy diet, lifestyle, and exercise.      No follow-ups on file.     Nino Daley MD, 2/16/2024     Supplementary Documentation:   General Health:  In the past six months, have you lost more than 10 pounds without trying?: 2 - No  Has your appetite been poor?: No  Type of Diet: Balanced  How does the patient maintain a good energy level?: Appropriate Exercise;Daily Walks;Stretching  How would you describe your daily physical activity?: Light  How would you describe your current health state?: Good  How do you maintain positive mental well-being?: Social Interaction;Puzzles;Games;Visiting Friends;Visiting Family  On a scale of 0 to 10, with 0 being no pain and 10 being severe pain, what is your pain level?: 3 - (Mild)  In the past six months, have you experienced urine leakage?: 1-Yes  At any time do you feel concerned for the safety/well-being of yourself and/or your children, in your home or elsewhere?: No  Have you had any immunizations at another office such as Influenza, Hepatitis B, Tetanus, or Pneumococcal?: Yes        Freeman Dominique's SCREENING SCHEDULE   Tests on this list are recommended by your physician but may  not be covered, or covered at this frequency, by your insurer.   Please check with your insurance carrier before scheduling to verify coverage.   PREVENTATIVE SERVICES FREQUENCY &  COVERAGE DETAILS LAST COMPLETION DATE   Diabetes Screening    Fasting Blood Sugar / Glucose    One screening every 12 months if never tested or if previously tested but not diagnosed with pre-diabetes   One screening every 6 months if diagnosed with pre-diabetes Lab Results   Component Value Date    GLU 98 02/09/2024        Cardiovascular Disease Screening    Lipid Panel  Cholesterol  Lipoprotein (HDL)  Triglycerides Covered every 5 years for all Medicare beneficiaries without apparent signs or symptoms of cardiovascular disease Lab Results   Component Value Date    CHOLEST 178 02/09/2024    HDL 38 (L) 02/09/2024     (H) 02/09/2024    TRIG 127 02/09/2024         Electrocardiogram (EKG)   Covered if needed at Welcome to Medicare, and non-screening if indicated for medical reasons 03/08/2022      Ultrasound Screening for Abdominal Aortic Aneurysm (AAA) Covered once in a lifetime for one of the following risk factors    Men who are 65-75 years old and have ever smoked    Anyone with a family history -     Colorectal Cancer Screening  Covered for ages 50-85; only need ONE of the following:    Colonoscopy   Covered every 10 years    Covered every 2 years if patient is at high risk or previous colonoscopy was abnormal 11/11/2021    No recommendations at this time    Flexible Sigmoidoscopy   Covered every 4 years -    Fecal Occult Blood Test Covered annually -   Prostate Cancer Screening    Prostate-Specific Antigen (PSA) Annually No results found for: \"PSA\"  There are no preventive care reminders to display for this patient.   Immunizations    Influenza Covered once per flu season  Please get every year 12/13/2023  No recommendations at this time    Pneumococcal Each vaccine ([id number] & [id number]) covered once after 65 Prevnar 13:  06/22/2016    [id number]: 01/21/2022     No recommendations at this time    Hepatitis B One screening covered for patients with certain risk factors   -  No recommendations at this time    Tetanus Toxoid Not covered by Medicare Part B unless medically necessary (cut with metal); may be covered with your pharmacy prescription benefits -    Tetanus, Diptheria and Pertusis TD and TDaP Not covered by Medicare Part B -  No recommendations at this time    Zoster Not covered by Medicare Part B; may be covered with your pharmacy  prescription benefits -  No recommendations at this time

## 2024-02-22 ENCOUNTER — CARDPULM VISIT (OUTPATIENT)
Dept: CARDIAC REHAB | Facility: HOSPITAL | Age: 84
End: 2024-02-22
Attending: INTERNAL MEDICINE
Payer: MEDICARE

## 2024-02-27 ENCOUNTER — CARDPULM VISIT (OUTPATIENT)
Dept: CARDIAC REHAB | Facility: HOSPITAL | Age: 84
End: 2024-02-27
Attending: INTERNAL MEDICINE
Payer: MEDICARE

## 2024-02-29 ENCOUNTER — CARDPULM VISIT (OUTPATIENT)
Dept: CARDIAC REHAB | Facility: HOSPITAL | Age: 84
End: 2024-02-29
Attending: INTERNAL MEDICINE
Payer: MEDICARE

## 2024-03-05 ENCOUNTER — CARDPULM VISIT (OUTPATIENT)
Dept: CARDIAC REHAB | Facility: HOSPITAL | Age: 84
End: 2024-03-05
Attending: INTERNAL MEDICINE
Payer: MEDICARE

## 2024-03-07 ENCOUNTER — CARDPULM VISIT (OUTPATIENT)
Dept: CARDIAC REHAB | Facility: HOSPITAL | Age: 84
End: 2024-03-07
Attending: INTERNAL MEDICINE
Payer: MEDICARE

## 2024-03-12 ENCOUNTER — CARDPULM VISIT (OUTPATIENT)
Dept: CARDIAC REHAB | Facility: HOSPITAL | Age: 84
End: 2024-03-12
Attending: INTERNAL MEDICINE
Payer: MEDICARE

## 2024-03-14 ENCOUNTER — CARDPULM VISIT (OUTPATIENT)
Dept: CARDIAC REHAB | Facility: HOSPITAL | Age: 84
End: 2024-03-14
Attending: INTERNAL MEDICINE
Payer: MEDICARE

## 2024-03-19 ENCOUNTER — CARDPULM VISIT (OUTPATIENT)
Dept: CARDIAC REHAB | Facility: HOSPITAL | Age: 84
End: 2024-03-19
Attending: INTERNAL MEDICINE
Payer: MEDICARE

## 2024-03-21 ENCOUNTER — CARDPULM VISIT (OUTPATIENT)
Dept: CARDIAC REHAB | Facility: HOSPITAL | Age: 84
End: 2024-03-21
Attending: INTERNAL MEDICINE
Payer: MEDICARE

## 2024-03-26 ENCOUNTER — CARDPULM VISIT (OUTPATIENT)
Dept: CARDIAC REHAB | Facility: HOSPITAL | Age: 84
End: 2024-03-26
Attending: INTERNAL MEDICINE
Payer: MEDICARE

## 2024-03-28 ENCOUNTER — CARDPULM VISIT (OUTPATIENT)
Dept: CARDIAC REHAB | Facility: HOSPITAL | Age: 84
End: 2024-03-28
Attending: INTERNAL MEDICINE
Payer: MEDICARE

## 2024-04-02 ENCOUNTER — CARDPULM VISIT (OUTPATIENT)
Dept: CARDIAC REHAB | Facility: HOSPITAL | Age: 84
End: 2024-04-02
Attending: INTERNAL MEDICINE
Payer: MEDICARE

## 2024-04-09 ENCOUNTER — APPOINTMENT (OUTPATIENT)
Dept: CARDIAC REHAB | Facility: HOSPITAL | Age: 84
End: 2024-04-09
Attending: INTERNAL MEDICINE
Payer: MEDICARE

## 2024-04-11 ENCOUNTER — APPOINTMENT (OUTPATIENT)
Dept: CARDIAC REHAB | Facility: HOSPITAL | Age: 84
End: 2024-04-11
Attending: INTERNAL MEDICINE
Payer: MEDICARE

## 2024-04-16 ENCOUNTER — CARDPULM VISIT (OUTPATIENT)
Dept: CARDIAC REHAB | Facility: HOSPITAL | Age: 84
End: 2024-04-16
Attending: INTERNAL MEDICINE
Payer: MEDICARE

## 2024-04-18 ENCOUNTER — CARDPULM VISIT (OUTPATIENT)
Dept: CARDIAC REHAB | Facility: HOSPITAL | Age: 84
End: 2024-04-18
Attending: INTERNAL MEDICINE
Payer: MEDICARE

## 2024-04-23 ENCOUNTER — CARDPULM VISIT (OUTPATIENT)
Dept: CARDIAC REHAB | Facility: HOSPITAL | Age: 84
End: 2024-04-23
Attending: INTERNAL MEDICINE
Payer: MEDICARE

## 2024-04-25 ENCOUNTER — CARDPULM VISIT (OUTPATIENT)
Dept: CARDIAC REHAB | Facility: HOSPITAL | Age: 84
End: 2024-04-25
Attending: INTERNAL MEDICINE
Payer: MEDICARE

## 2024-04-30 ENCOUNTER — CARDPULM VISIT (OUTPATIENT)
Dept: CARDIAC REHAB | Facility: HOSPITAL | Age: 84
End: 2024-04-30
Attending: INTERNAL MEDICINE
Payer: MEDICARE

## 2024-05-02 ENCOUNTER — CARDPULM VISIT (OUTPATIENT)
Dept: CARDIAC REHAB | Facility: HOSPITAL | Age: 84
End: 2024-05-02
Attending: INTERNAL MEDICINE
Payer: MEDICARE

## 2024-05-07 ENCOUNTER — CARDPULM VISIT (OUTPATIENT)
Dept: CARDIAC REHAB | Facility: HOSPITAL | Age: 84
End: 2024-05-07
Attending: INTERNAL MEDICINE
Payer: MEDICARE

## 2024-05-09 ENCOUNTER — CARDPULM VISIT (OUTPATIENT)
Dept: CARDIAC REHAB | Facility: HOSPITAL | Age: 84
End: 2024-05-09
Attending: INTERNAL MEDICINE
Payer: MEDICARE

## 2024-05-14 ENCOUNTER — CARDPULM VISIT (OUTPATIENT)
Dept: CARDIAC REHAB | Facility: HOSPITAL | Age: 84
End: 2024-05-14
Attending: INTERNAL MEDICINE

## 2024-05-16 ENCOUNTER — CARDPULM VISIT (OUTPATIENT)
Dept: CARDIAC REHAB | Facility: HOSPITAL | Age: 84
End: 2024-05-16
Attending: INTERNAL MEDICINE

## 2024-05-21 ENCOUNTER — CARDPULM VISIT (OUTPATIENT)
Dept: CARDIAC REHAB | Facility: HOSPITAL | Age: 84
End: 2024-05-21
Attending: INTERNAL MEDICINE

## 2024-05-23 ENCOUNTER — CARDPULM VISIT (OUTPATIENT)
Dept: CARDIAC REHAB | Facility: HOSPITAL | Age: 84
End: 2024-05-23
Attending: INTERNAL MEDICINE

## 2024-05-28 ENCOUNTER — CARDPULM VISIT (OUTPATIENT)
Dept: CARDIAC REHAB | Facility: HOSPITAL | Age: 84
End: 2024-05-28
Attending: INTERNAL MEDICINE

## 2024-05-30 ENCOUNTER — CARDPULM VISIT (OUTPATIENT)
Dept: CARDIAC REHAB | Facility: HOSPITAL | Age: 84
End: 2024-05-30
Attending: INTERNAL MEDICINE

## 2024-06-06 ENCOUNTER — CARDPULM VISIT (OUTPATIENT)
Dept: CARDIAC REHAB | Facility: HOSPITAL | Age: 84
End: 2024-06-06
Attending: INTERNAL MEDICINE
Payer: MEDICARE

## 2024-06-11 ENCOUNTER — CARDPULM VISIT (OUTPATIENT)
Dept: CARDIAC REHAB | Facility: HOSPITAL | Age: 84
End: 2024-06-11
Attending: INTERNAL MEDICINE
Payer: MEDICARE

## 2024-06-13 ENCOUNTER — CARDPULM VISIT (OUTPATIENT)
Dept: CARDIAC REHAB | Facility: HOSPITAL | Age: 84
End: 2024-06-13
Attending: INTERNAL MEDICINE
Payer: MEDICARE

## 2024-06-18 ENCOUNTER — CARDPULM VISIT (OUTPATIENT)
Dept: CARDIAC REHAB | Facility: HOSPITAL | Age: 84
End: 2024-06-18
Attending: INTERNAL MEDICINE

## 2024-06-20 ENCOUNTER — CARDPULM VISIT (OUTPATIENT)
Dept: CARDIAC REHAB | Facility: HOSPITAL | Age: 84
End: 2024-06-20
Attending: INTERNAL MEDICINE

## 2024-06-27 ENCOUNTER — CARDPULM VISIT (OUTPATIENT)
Dept: CARDIAC REHAB | Facility: HOSPITAL | Age: 84
End: 2024-06-27
Attending: INTERNAL MEDICINE

## 2024-07-02 ENCOUNTER — CARDPULM VISIT (OUTPATIENT)
Dept: CARDIAC REHAB | Facility: HOSPITAL | Age: 84
End: 2024-07-02
Attending: INTERNAL MEDICINE
Payer: MEDICARE

## 2024-07-02 ENCOUNTER — TELEPHONE (OUTPATIENT)
Dept: PULMONOLOGY | Facility: CLINIC | Age: 84
End: 2024-07-02

## 2024-07-02 NOTE — TELEPHONE ENCOUNTER
LOV: 1/04/2024  Last refill: 10/26/2022    Dr. Dotson-please review and sign pended prescription if agreeable.

## 2024-07-02 NOTE — TELEPHONE ENCOUNTER
Current Outpatient Medications   Medication Sig Dispense Refill    Tiotropium Bromide-Olodaterol 2.5-2.5 MCG/ACT Inhalation Aero Soln Inhale 2 puffs into the lungs daily. 3 each 3     Pt stopped at desk- would like refill- was last filled by Dr Sarkar   Pl call pt to let him know

## 2024-07-05 ENCOUNTER — TELEPHONE (OUTPATIENT)
Dept: PULMONOLOGY | Facility: CLINIC | Age: 84
End: 2024-07-05

## 2024-07-08 NOTE — TELEPHONE ENCOUNTER
Oxygen Order signed by Dr. Dotson and faxed back to [facility] with confirmation. Copy placed in the E folder for  and order sent for scanning.

## 2024-07-11 ENCOUNTER — CARDPULM VISIT (OUTPATIENT)
Dept: CARDIAC REHAB | Facility: HOSPITAL | Age: 84
End: 2024-07-11
Attending: INTERNAL MEDICINE
Payer: MEDICARE

## 2024-07-16 ENCOUNTER — CARDPULM VISIT (OUTPATIENT)
Dept: CARDIAC REHAB | Facility: HOSPITAL | Age: 84
End: 2024-07-16
Attending: INTERNAL MEDICINE
Payer: MEDICARE

## 2024-07-18 ENCOUNTER — CARDPULM VISIT (OUTPATIENT)
Dept: CARDIAC REHAB | Facility: HOSPITAL | Age: 84
End: 2024-07-18
Attending: INTERNAL MEDICINE
Payer: MEDICARE

## 2024-07-23 ENCOUNTER — APPOINTMENT (OUTPATIENT)
Dept: CARDIAC REHAB | Facility: HOSPITAL | Age: 84
End: 2024-07-23
Attending: INTERNAL MEDICINE
Payer: MEDICARE

## 2024-07-25 ENCOUNTER — APPOINTMENT (OUTPATIENT)
Dept: CARDIAC REHAB | Facility: HOSPITAL | Age: 84
End: 2024-07-25
Attending: INTERNAL MEDICINE
Payer: MEDICARE

## 2024-10-03 ENCOUNTER — PATIENT MESSAGE (OUTPATIENT)
Dept: PULMONOLOGY | Facility: CLINIC | Age: 84
End: 2024-10-03

## 2024-10-03 ENCOUNTER — TELEPHONE (OUTPATIENT)
Dept: PULMONOLOGY | Facility: CLINIC | Age: 84
End: 2024-10-03

## 2024-10-03 ENCOUNTER — OFFICE VISIT (OUTPATIENT)
Dept: PULMONOLOGY | Facility: CLINIC | Age: 84
End: 2024-10-03
Payer: MEDICARE

## 2024-10-03 VITALS
SYSTOLIC BLOOD PRESSURE: 126 MMHG | DIASTOLIC BLOOD PRESSURE: 73 MMHG | HEIGHT: 66 IN | RESPIRATION RATE: 12 BRPM | BODY MASS INDEX: 24.01 KG/M2 | HEART RATE: 84 BPM | OXYGEN SATURATION: 98 % | WEIGHT: 149.38 LBS

## 2024-10-03 DIAGNOSIS — J84.9 ILD (INTERSTITIAL LUNG DISEASE) (HCC): Primary | ICD-10-CM

## 2024-10-03 PROCEDURE — 99213 OFFICE O/P EST LOW 20 MIN: CPT | Performed by: INTERNAL MEDICINE

## 2024-10-03 NOTE — TELEPHONE ENCOUNTER
----- Message from [employer]  sent at 10/3/2024 10:58 AM CDT -----  Regarding: Oxygen while sleeping  Contact: [phone]  I should have asked about the continuation of the use of oxygen while I sleep. Should I continue. Presently set at 2 liters.

## 2024-10-03 NOTE — PROGRESS NOTES
The patient is an 83-year-old male who I know well from prior evaluation who comes in now for follow-up.  In general, he has been clinically stable.  He completed pulmonary rehabilitation and really enjoyed the program and now is continuing 3 days/week going [place] and exercising.  He is yet of the mindset that he does not want to take specific therapeutic for the ILD.    Review of Systems:  Vision normal. Ear nose and throat normal. Bowel normal. Bladder function normal. No depression. No thyroid disease. No lymphatic system concerns.  No rash. Muscles and joints unremarkable. No weight loss no weight gain.    Physical Examination:  Vital signs normal. HEENT examination is unremarkable with pupils equal round and reactive to light and accommodation. Neck without adenopathy, thyromegaly, JVD nor bruit. Lungs notable for a few dry bibasilar crackles to auscultation and percussion. Cardiac regular rate and rhythm no murmur. Abdomen nontender, without hepatosplenomegaly and no mass appreciable. Extremities and Musculoskeletal without clubbing cyanosis nor edema, and mobility acceptable. Neurologic grossly intact with symmetric tone and strength and reflex.    Assessment and plan:  1.  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  Patient yet declines specific therapeutic for the suspected IPF.    Recommendations: Conservative management, continue activities of pulmonary rehabilitation with regular exercise, annual flu shot, COVID booster, RSV vaccine, see me again at the 1 year interval or sooner if needed and contact me promptly if new trouble.  Will consider repeating the high-resolution CT scan of the chest at that juncture.

## 2024-10-03 NOTE — TELEPHONE ENCOUNTER
From: Freeman Dominique  To: Piotr Dotson  Sent: 10/3/2024 10:58 AM CDT  Subject: Oxygen while sleeping    I should have asked about the continuation of the use of oxygen while I sleep. Should I continue. Presently set at 2 liters.

## 2024-12-31 ENCOUNTER — APPOINTMENT (OUTPATIENT)
Dept: GENERAL RADIOLOGY | Facility: HOSPITAL | Age: 84
End: 2024-12-31
Payer: MEDICARE

## 2024-12-31 ENCOUNTER — HOSPITAL ENCOUNTER (EMERGENCY)
Facility: HOSPITAL | Age: 84
Discharge: HOME | End: 2024-12-31
Attending: EMERGENCY MEDICINE
Payer: MEDICARE

## 2024-12-31 VITALS
TEMPERATURE: 97 F | DIASTOLIC BLOOD PRESSURE: 70 MMHG | RESPIRATION RATE: 17 BRPM | OXYGEN SATURATION: 98 % | SYSTOLIC BLOOD PRESSURE: 130 MMHG | HEART RATE: 72 BPM

## 2024-12-31 DIAGNOSIS — B34.9 VIRAL SYNDROME: Primary | ICD-10-CM

## 2024-12-31 LAB
ALBUMIN SERPL-MCNC: 4.5 G/DL (ref 3.2–4.8)
ALBUMIN/GLOB SERPL: 1.5 {RATIO} (ref 1–2)
ALP LIVER SERPL-CCNC: 86 U/L
ALT SERPL-CCNC: 15 U/L
ANION GAP SERPL CALC-SCNC: 8 MMOL/L (ref 0–18)
AST SERPL-CCNC: 26 U/L (ref ?–34)
BASOPHILS # BLD AUTO: 0.07 X10(3) UL (ref 0–0.2)
BASOPHILS NFR BLD AUTO: 0.9 %
BILIRUB SERPL-MCNC: 0.7 MG/DL (ref 0.2–1.1)
BUN BLD-MCNC: 16 MG/DL (ref 9–23)
BUN/CREAT SERPL: 11.3 (ref 10–20)
CALCIUM BLD-MCNC: 9.8 MG/DL (ref 8.7–10.4)
CHLORIDE SERPL-SCNC: 108 MMOL/L (ref 98–112)
CO2 SERPL-SCNC: 25 MMOL/L (ref 21–32)
CREAT BLD-MCNC: 1.42 MG/DL
DEPRECATED RDW RBC AUTO: 43.4 FL (ref 35.1–46.3)
EGFRCR SERPLBLD CKD-EPI 2021: 49 ML/MIN/1.73M2 (ref 60–?)
EOSINOPHIL # BLD AUTO: 0.22 X10(3) UL (ref 0–0.7)
EOSINOPHIL NFR BLD AUTO: 2.7 %
ERYTHROCYTE [DISTWIDTH] IN BLOOD BY AUTOMATED COUNT: 13 % (ref 11–15)
FLUAV + FLUBV RNA SPEC NAA+PROBE: NEGATIVE
FLUAV + FLUBV RNA SPEC NAA+PROBE: NEGATIVE
GLOBULIN PLAS-MCNC: 3 G/DL (ref 2–3.5)
GLUCOSE BLD-MCNC: 111 MG/DL (ref 70–99)
HCT VFR BLD AUTO: 43.9 %
HGB BLD-MCNC: 15.1 G/DL
IMM GRANULOCYTES # BLD AUTO: 0.02 X10(3) UL (ref 0–1)
IMM GRANULOCYTES NFR BLD: 0.2 %
LYMPHOCYTES # BLD AUTO: 1.39 X10(3) UL (ref 1–4)
LYMPHOCYTES NFR BLD AUTO: 17.2 %
MCH RBC QN AUTO: 31 PG (ref 26–34)
MCHC RBC AUTO-ENTMCNC: 34.4 G/DL (ref 31–37)
MCV RBC AUTO: 90.1 FL
MONOCYTES # BLD AUTO: 0.58 X10(3) UL (ref 0.1–1)
MONOCYTES NFR BLD AUTO: 7.2 %
NEUTROPHILS # BLD AUTO: 5.78 X10 (3) UL (ref 1.5–7.7)
NEUTROPHILS # BLD AUTO: 5.78 X10(3) UL (ref 1.5–7.7)
NEUTROPHILS NFR BLD AUTO: 71.8 %
NT-PROBNP SERPL-MCNC: 695 PG/ML (ref ?–450)
OSMOLALITY SERPL CALC.SUM OF ELEC: 294 MOSM/KG (ref 275–295)
PLATELET # BLD AUTO: 287 10(3)UL (ref 150–450)
POTASSIUM SERPL-SCNC: 4.4 MMOL/L (ref 3.5–5.1)
PROT SERPL-MCNC: 7.5 G/DL (ref 5.7–8.2)
RBC # BLD AUTO: 4.87 X10(6)UL
RSV RNA SPEC NAA+PROBE: NEGATIVE
SARS-COV-2 RNA RESP QL NAA+PROBE: NOT DETECTED
SODIUM SERPL-SCNC: 141 MMOL/L (ref 136–145)
WBC # BLD AUTO: 8.1 X10(3) UL (ref 4–11)

## 2024-12-31 PROCEDURE — 71045 X-RAY EXAM CHEST 1 VIEW: CPT

## 2024-12-31 PROCEDURE — 0241U SARS-COV-2/FLU A AND B/RSV BY PCR (GENEXPERT): CPT | Performed by: EMERGENCY MEDICINE

## 2024-12-31 PROCEDURE — 83880 ASSAY OF NATRIURETIC PEPTIDE: CPT | Performed by: EMERGENCY MEDICINE

## 2024-12-31 PROCEDURE — 94640 AIRWAY INHALATION TREATMENT: CPT

## 2024-12-31 PROCEDURE — 85025 COMPLETE CBC W/AUTO DIFF WBC: CPT | Performed by: EMERGENCY MEDICINE

## 2024-12-31 PROCEDURE — 0241U SARS-COV-2/FLU A AND B/RSV BY PCR (GENEXPERT): CPT

## 2024-12-31 PROCEDURE — 96374 THER/PROPH/DIAG INJ IV PUSH: CPT

## 2024-12-31 PROCEDURE — 99284 EMERGENCY DEPT VISIT MOD MDM: CPT

## 2024-12-31 PROCEDURE — 80053 COMPREHEN METABOLIC PANEL: CPT | Performed by: EMERGENCY MEDICINE

## 2024-12-31 PROCEDURE — 99285 EMERGENCY DEPT VISIT HI MDM: CPT

## 2024-12-31 RX ORDER — PREDNISONE 20 MG/1
40 TABLET ORAL DAILY
Qty: 10 TABLET | Refills: 0 | Status: SHIPPED | OUTPATIENT
Start: 2024-12-31 | End: 2025-01-05

## 2024-12-31 RX ORDER — METHYLPREDNISOLONE SODIUM SUCCINATE 125 MG/2ML
80 INJECTION INTRAMUSCULAR; INTRAVENOUS ONCE
Status: COMPLETED | OUTPATIENT
Start: 2024-12-31 | End: 2024-12-31

## 2024-12-31 RX ORDER — IPRATROPIUM BROMIDE AND ALBUTEROL SULFATE 2.5; .5 MG/3ML; MG/3ML
3 SOLUTION RESPIRATORY (INHALATION) ONCE
Status: COMPLETED | OUTPATIENT
Start: 2024-12-31 | End: 2024-12-31

## 2024-12-31 NOTE — ED PROVIDER NOTES
Patient Seen in: [facility] Emergency Department    History     Chief Complaint   Patient presents with    Difficulty Breathing       HPI    History is provided by patient/independent historian: patient  84 year old male with hx of CKD, COPD, here with c/o head cold symptoms for the last few days and difficulty breathing.  He hears wheezing.  He has been using his inhaled steroid inhaler.  No chest pain.  No leg swelling.    History reviewed.   Past Medical History:    Aortic atherosclerosis (HCC)    CXR 9-11    Stubbs esophagus    EGD 9-16 with large paraesophageal hiatal hernia, Stubbs's esophagus, biopsy negative for dysplasia    BCC (basal cell carcinoma of skin)    scalp    Carcinoma in situ of colon    transverse    Chronic kidney disease, stage 3 (HCC)    Diverticulosis of large intestine    Dyslipidemia    GERD (gastroesophageal reflux disease)    ILD (interstitial lung disease) ([facility])    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; PFTs 10-22 with severe restriction with TLC 3.08 L (51%)    Iron deficiency anemia    Melanoma in situ of cheek (HCC)    Paraesophageal hiatal hernia    Pneumonia         History reviewed.   Past Surgical History:   Procedure Laterality Date    Appendectomy  1956    Cataract extraction Bilateral  2015    Colonoscopy N/A 11/10/2018    Procedure: COLONOSCOPY;  Surgeon: David Gongora MD;  Location: [facility] ENDOSCOPY    Colonoscopy N/A 11/11/2021    Procedure: COLONOSCOPY;  Surgeon: David Gongora MD;  Location: [facility] ENDOSCOPY    Colonoscopy & polypectomy  9/15/16    Egd  9/15/16    Inguinal hernia repair Right 1955    Laparoscopy, surgical; colectomy, partial, w/ anastomos Right October 2016    Repair recurr inguin chapis,reducibl Right 1975    With right orchiectomy    Skin surgery  July 2014    Excision melanoma in situ right cheek and BCCa scalp    Tonsillectomy  1948         Home Medications reviewed :  Prescriptions Prior to Admission[1]      History reviewed.   Social History      Socioeconomic History    Marital status:     Number of children: 1   Occupational History    Occupation: [employer]   Tobacco Use    Smoking status: Former     Current packs/day: 0.00     Average packs/day: 1 pack/day for 36.0 years (36.0 ttl pk-yrs)     Types: Cigarettes     Start date:      Quit date: 1992     Years since quittin.4    Smokeless tobacco: Never    Tobacco comments:     Started at age 17   Vaping Use    Vaping status: Never Used   Substance and Sexual Activity    Alcohol use: Yes     Alcohol/week: 0.0 standard drinks of alcohol     Comment: Infrequent    Drug use: No   Other Topics Concern    Grew up on a farm No    History of tanning Yes    Outdoor occupation No    Pt has a pacemaker No    Pt has a defibrillator No    Reaction to local anesthetic No    Caffeine Concern Yes     Comment: Coffee 3 cups daily         ROS  Review of Systems   Respiratory:  Positive for shortness of breath and wheezing.    Cardiovascular:  Negative for chest pain.   All other systems reviewed and are negative.     All other pertinent organ systems are reviewed and are negative.      Physical Exam     ED Triage Vitals [24 1036]   /73   Pulse 93   Resp 20   Temp 97 °F (36.1 °C)   Temp src Temporal   SpO2 94 %   O2 Device None (Room air)     Vital signs reviewed.      Physical Exam  Vitals and nursing note reviewed.   Cardiovascular:      Pulses: Normal pulses.   Pulmonary:      Effort: No respiratory distress.      Breath sounds: Wheezing present.   Abdominal:      General: There is no distension.   Neurological:      Mental Status: He is alert.         ED Course       Labs:     Labs Reviewed   COMP METABOLIC PANEL (14) - Abnormal; Notable for the following components:       Result Value    Glucose 111 (*)     Creatinine 1.42 (*)     eGFR-Cr 49 (*)     All other components within normal limits   PRO BETA NATRIURETIC PEPTIDE - Abnormal; Notable for the following components:    Pro-Beta  Natriuretic Peptide 695 (*)     All other components within normal limits   SARS-COV-2/FLU A AND B/RSV BY PCR (GENEXPERT) - Normal    Narrative:     This test is intended for the qualitative detection and differentiation of SARS-CoV-2, influenza A, influenza B, and respiratory syncytial virus (RSV) viral RNA in nasopharyngeal or nares swabs from individuals suspected of respiratory viral infection consistent with COVID-19 by their healthcare provider. Signs and symptoms of respiratory viral infection due to SARS-CoV-2, influenza, and RSV can be similar.                                    Test performed using the Xpert Xpress SARS-CoV-2/FLU/RSV (real time RT-PCR)  assay on the GeneXpert instrument, [email or web address], [place] [address].                   This test is being used under the Food and Drug Administration's Emergency Use Authorization.                                    The authorized Fact Sheet for Healthcare Providers for this assay is available upon request from the laboratory.   CBC WITH DIFFERENTIAL WITH PLATELET   RAINBOW DRAW LAVENDER   RAINBOW DRAW LIGHT GREEN   RAINBOW DRAW BLUE         My EKG Interpretation:   As reviewed and Interpreted by me      Imaging Results Available and Reviewed while in ED:   XR CHEST AP PORTABLE  (CPT=71045)    Result Date: 12/31/2024  CONCLUSION:   Extensive peripheral reticular interstitial opacities throughout both lungs, which appear very similar in comparison to prior chest radiograph from March, 2022. Findings are compatible with chronic interstitial lung disease/pulmonary fibrosis.  No definite new or worsening focal airspace disease.    elm-remote  Dictated by (CST): Kevin Asencio MD on 12/31/2024 at 11:20 AM     Finalized by (CST): Kevin Asencio MD on 12/31/2024 at 11:22 AM         My review and independent interpretation of XR images: no infiltrate. Radiology report corroborates this in addition to other details as reported by them.      Decision rules/scores  evaluated: none      Diagnostic labs/tests considered but not ordered: none    ED Medications Administered:   Medications   ipratropium-albuterol (Duoneb) 0.5-2.5 (3) MG/3ML inhalation solution 3 mL (3 mL Nebulization Given 12/31/24 1202)   methylPREDNISolone sodium succinate (Solu-MEDROL) injection 80 mg (80 mg Intravenous Given 12/31/24 1137)                MDM       Medical Decision Making      Differential Diagnosis: After obtaining the patient's history, performing the physical exam and reviewing the diagnostics, multiple initial diagnoses were considered based on the presenting problem including CHF exacerbation, pneumonia, viral syndrome    External document review: I personally reviewed available external medical records for any recent pertinent discharge summaries, testing, and procedures - the findings are as follows: 10/3/24 visit with Dr. Buckley for ILD    Complicating Factors: The patient already  has a past medical history of Aortic atherosclerosis ([facility]), Stubbs esophagus (09/2016), BCC (basal cell carcinoma of skin) (2014), Carcinoma in situ of colon (09/2016), Chronic kidney disease, stage 3 ([facility]), Diverticulosis of large intestine, Dyslipidemia, GERD (gastroesophageal reflux disease), ILD (interstitial lung disease) (HCC), Iron deficiency anemia (06/2016), Melanoma in situ of cheek (HCC) (07/2014), Paraesophageal hiatal hernia, and Pneumonia (1985). to contribute to the complexity of this ED evaluation.    Procedures performed: none    Discussed management with physician/appropriate source: none    Considered admission/deescalation of care for: none    Social determinants of health affecting patient care: none    Prescription medications considered: prednisone, discussed continuing current medication regimen    The patient requires continuous monitoring for: cold symptoms    Shared decision making: discussed possible admission          Disposition and Plan     Clinical Impression:  1. Viral syndrome         Disposition:  Discharge    Follow-up:  Nino Daley MD  [address] [place] [id number]  [phone]    Follow up        Medications Prescribed:  Current Discharge Medication List        START taking these medications    Details   predniSONE 20 MG Oral Tab Take 2 tablets (40 mg total) by mouth daily for 5 days.  Qty: 10 tablet, Refills: 0                            [1] (Not in a hospital admission)

## 2024-12-31 NOTE — ED INITIAL ASSESSMENT (HPI)
Patient aox3 to ed via private vehicle co of emily x few hours hx of copd , has been sick x few days today

## 2025-02-11 NOTE — TELEPHONE ENCOUNTER
Dr. Dotson- please sign pended order for Tiotropium Bromide-Olodaterol, if agreeable    Last office visit: 10/3/2024 Follow up: 10/7/2025 Last refill: 7/2/2024

## 2025-02-18 ENCOUNTER — OFFICE VISIT (OUTPATIENT)
Dept: INTERNAL MEDICINE CLINIC | Facility: CLINIC | Age: 85
End: 2025-02-18
Payer: MEDICARE

## 2025-02-18 VITALS
BODY MASS INDEX: 23.14 KG/M2 | SYSTOLIC BLOOD PRESSURE: 132 MMHG | OXYGEN SATURATION: 99 % | WEIGHT: 144 LBS | HEART RATE: 71 BPM | HEIGHT: 66 IN | DIASTOLIC BLOOD PRESSURE: 70 MMHG

## 2025-02-18 DIAGNOSIS — D03.39 MELANOMA IN SITU OF CHEEK (HCC): ICD-10-CM

## 2025-02-18 DIAGNOSIS — E78.00 HYPERCHOLESTEROLEMIA: ICD-10-CM

## 2025-02-18 DIAGNOSIS — N18.30 STAGE 3 CHRONIC KIDNEY DISEASE, UNSPECIFIED WHETHER STAGE 3A OR 3B CKD (HCC): Primary | ICD-10-CM

## 2025-02-18 DIAGNOSIS — J84.9 ILD (INTERSTITIAL LUNG DISEASE) (HCC): ICD-10-CM

## 2025-02-18 PROBLEM — L57.8 SUN-DAMAGED SKIN: Status: RESOLVED | Noted: 2018-11-12 | Resolved: 2025-02-18

## 2025-02-18 PROBLEM — L57.0 ACTINIC KERATOSIS: Status: RESOLVED | Noted: 2018-05-08 | Resolved: 2025-02-18

## 2025-02-18 PROBLEM — L21.9 SEBORRHEIC DERMATITIS: Status: RESOLVED | Noted: 2019-02-18 | Resolved: 2025-02-18

## 2025-02-18 PROCEDURE — G0439 PPPS, SUBSEQ VISIT: HCPCS | Performed by: INTERNAL MEDICINE

## 2025-02-18 NOTE — PROGRESS NOTES
The following individual(s) verbally consented to be recorded using ambient AI listening technology and understand that they can each withdraw their consent to this listening technology at any point by asking the clinician to turn off or pause the recording: Yes    Patient name: Freeman Dominique  Additional names:     Information your provider wants you to know    Your opinion matters! Thank you for choosing Dr. Eliza Carrillo at [facility].You might receive a survey in the mail or via e-mail about your experience today to evaluate our clinic. Please fill out and return it in the pre-paid envelope. It was a pleasure to care for you today!    Please include comments and feedback to our office on how we can improve.       Clinic Policy:    Cancellation and No Show: If you must cancel a visit, please give 24 hours notice so we can accommodate other patients waiting to be seen. As a courtesy our automated system will place a reminder call to you 48 hours prior to your appointment time. Any appointment cancelled less than 2 hours prior to start time will be considered a “No Show”.  You can cancel your appointment by calling our office at [phone] anytime or online via your [employer] account. Our office is usually closed on Fridays.     Forms (FMLA/ Disability):    Please complete your portion of any forms prior to bringing them into the office. This includes adding a telephone number where you can be reached during normal business hours. Please allow 7-14 days for any form to be completed. Some FMLA forms will need appointment to be completed.     Medication Requests:    Please plan ahead. We need up to 2 business days notice for refills to be completed. Please contact your preferred pharmacy for your refills. The pharmacy will contact the office for the refills.     Messages:    Message left for Dr. Eliza Carrillo will be returned within 24 business hours or sooner.     Test results:    Our goal is to report all test results ordered by Dr. Eliza Carrillo within 7-14 days. If you do not receive your test results either by phone or Toolmeet message within 14 days after your visit with our office, please call our office at [phone] and ask to speak with a member of our care team or send your care  team a message via your [employer] account.

## 2025-02-18 NOTE — PROGRESS NOTES
Subjective:   Freeman Dominique is a 84 year old male who presents for a Medicare Wellness Visit charge within the last 11 months and Patient may not meet criteria for AWV: Please evaluate for correct coding and scheduled follow up of multiple significant but stable problems.   History of Present Illness  The patient, previously under the care of Dr. Daley and Dr. De Dios, is establishing care with the new provider. He has a history of interstitial lung disease, managed by Dr. Tyler, and a kidney condition that has been stable for the past ten years. The patient was initially concerned about the kidney diagnosis but was reassured by the provider that the condition is not expected to significantly affect his health. He has not seen a nephrologist for this condition.    The patient also has a history of Stubbs's esophagus, managed with Ruzurgi, and regularly sees a dermatologist, Dr. Gandara, for skin checks due to a past melanoma on the cheek. He had a colonoscopy in 2021, performed by Dr. Gongora, who advised no further colonoscopies were needed. 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 The surgery was successful, with a resection performed and nineteen lymph nodes removed.  He also had endoscopy that did not show any Stubbs's.    The patient visited the ER on December 31st due to difficulty breathing. Extensive blood work was done during this visit. He has a history of GERD and has had polyps and hemorrhoids in the past. The patient is the last surviving sibling in his family, with all others passing away before the age of eighty.  No  History/Other:   Fall Risk Assessment:   He has been screened for Falls and is low risk.      Cognitive Assessment:   He had a completely normal cognitive assessment - see flowsheet entries     Functional Ability/Status:   Freeman Dominique has some abnormal functions as listed below:  He has Hearing problems based on screening of functional  status.He has problems with Memory based on screening of functional status.       Depression Screening (PHQ):  PHQ-2 SCORE: 0  , done 2/18/2025        <5 minutes spent screening and counseling for depression    Advanced Directives:   He does have a Living Will but we do NOT have it on file in Epic.    He has a Power of  for Health Care on file in [facility].  Discussed Advance Care Planning with patient (and family/surrogate if present). Standard forms made available to patient in After Visit Summary.      Patient Active Problem List   Diagnosis    Melanoma in situ of cheek (HCC)    GERD (gastroesophageal reflux disease)    Hypercholesterolemia    Carcinoma in situ of colon    History of melanoma in situ    Aortic atherosclerosis    BCC (basal cell carcinoma of skin)    Diverticulosis of large intestine    Paraesophageal hiatal hernia    History of basal cell carcinoma    Chronic kidney disease, stage 3 (HCC)    ILD (interstitial lung disease) ([facility])     Allergies:  He is allergic to molds & smuts and penicillins.    Current Medications:  Outpatient Medications Marked as Taking for the 2/18/25 encounter (Office Visit) with Rosalio Epperson MD   Medication Sig    Tiotropium Bromide-Olodaterol 2.5-2.5 MCG/ACT Inhalation Aero Soln Inhale 2 puffs into the lungs daily.    Multiple Vitamins-Minerals (PRESERVISION AREDS 2+MULTI VIT OR)     albuterol 108 (90 Base) MCG/ACT Inhalation Aero Soln Inhale 2 puffs into the lungs every 6 (six) hours as needed for Wheezing. inhale 2 puff by inhalation route  every 4 - 6 hours as needed    MULTIVITAMIN TAB/CAP Take 1 tablet by mouth daily.       Medical History:  He  has a past medical history of Aortic atherosclerosis, Stubbs esophagus (09/2016), BCC (basal cell carcinoma of skin) (2014), Carcinoma in situ of colon (09/2016), Chronic kidney disease, stage 3 (HCC), Diverticulosis of large intestine, Dyslipidemia, GERD (gastroesophageal reflux disease), ILD (interstitial lung  disease) (HCC), Iron deficiency anemia (2016), Melanoma in situ of cheek (HCC) (2014), Paraesophageal hiatal hernia, and Pneumonia ().  Surgical History:  He  has a past surgical history that includes tonsillectomy (); Inguinal hernia repair (Right, ); appendectomy (); repair recurr inguin chapis,reducibl (Right, ); skin surgery (2014); Cataract extraction (Bilateral,  ); laparoscopy, surgical; colectomy, partial, w/ anastomos (Right, 2016); colonoscopy & polypectomy (9/15/16); egd (9/15/16); colonoscopy (N/A, 11/10/2018); and colonoscopy (N/A, 2021).   Family History:  His family history includes Breast Cancer (age of onset: 50) in his sister; Breast Cancer (age of onset: 70) in his mother; COPD in his father and sister; Diabetes in his brother; Hypertension in his brother.  Social History:  He  reports that he quit smoking about 32 years ago. His smoking use included cigarettes. He started smoking about 68 years ago. He has a 36 pack-year smoking history. He has never used smokeless tobacco. He reports current alcohol use. He reports that he does not use drugs.    Tobacco:  He smoked tobacco in the past but quit greater than 12 months ago.  Social History     Tobacco Use   Smoking Status Former    Current packs/day: 0.00    Average packs/day: 1 pack/day for 36.0 years (36.0 ttl pk-yrs)    Types: Cigarettes    Start date:     Quit date: 1992    Years since quittin.6   Smokeless Tobacco Never   Tobacco Comments    Started at age 17        CAGE Alcohol Screen:   CAGE screening score of 0 on 2025, showing low risk of alcohol abuse.      Patient Care Team:  Rosalio Epperson MD as PCP - General (Internal Medicine)  Kelli Barakat RN as Nurse Navigator (ONCOLOGY)    Review of Systems   Constitutional:  Negative for activity change, appetite change and fever.   HENT:  Negative for congestion and voice change.    Respiratory:  Negative for cough and  shortness of breath.    Cardiovascular:  Negative for chest pain.   Gastrointestinal:  Negative for abdominal distention, abdominal pain and vomiting.   Genitourinary:  Negative for hematuria.   Skin:  Negative for wound.   Psychiatric/Behavioral:  Negative for behavioral problems.          Objective:   Physical Exam  Constitutional:       Appearance: Normal appearance.   HENT:      Head: Normocephalic.   Eyes:      Conjunctiva/sclera: Conjunctivae normal.   Cardiovascular:      Rate and Rhythm: Normal rate and regular rhythm.      Heart sounds: Normal heart sounds. No murmur heard.  Pulmonary:      Effort: Pulmonary effort is normal.      Breath sounds: Normal breath sounds. No rhonchi or rales.   Abdominal:      General: Bowel sounds are normal.      Palpations: Abdomen is soft.      Tenderness: There is no abdominal tenderness.   Musculoskeletal:      Cervical back: Neck supple.      Right lower leg: No edema.      Left lower leg: No edema.   Skin:     General: Skin is warm and dry.   Neurological:      General: No focal deficit present.      Mental Status: He is alert and oriented to person, place, and time. Mental status is at baseline.   Psychiatric:         Mood and Affect: Mood normal.         Behavior: Behavior normal.         /70   Pulse 71   Ht 5' 6\" (1.676 m)   Wt 144 lb (65.3 kg)   SpO2 99%   BMI 23.24 kg/m²  Estimated body mass index is 23.24 kg/m² as calculated from the following:    Height as of this encounter: 5' 6\" (1.676 m).    Weight as of this encounter: 144 lb (65.3 kg).    Medicare Hearing Assessment:   Hearing Screening    Time taken: 2/18/2025 11:23 AM  Screening Method: Questionnaire  I have a problem hearing over the telephone: No I have trouble following the conversations when two or more people are talking at the same time: No   I have trouble understanding things on the TV: No I have to strain to understand conversations: No   I have to worry about missing the telephone ring  or doorbell: No I have trouble hearing conversations in a noisy background such as a crowded room or restaurant: No   I get confused about where sounds come from: No I misunderstand some words in a sentence and need to ask people to repeat themselves: No   I especially have trouble understanding the speech of women and children: No I have trouble understanding the speaker in a large room such as at a meeting or place of [employer]: No   Many people I talk to seem to mumble (or don't speak clearly): No People get annoyed because I misunderstand what they say: No   I misunderstand what others are saying and make inappropriate responses: No I avoid social activities because I cannot hear well and fear I will reply improperly: No   Family members and friends have told me they think I may have hearing loss: No                   Assessment & Plan:   Freeman Dominique is a 84 year old male who presents for a Medicare Assessment.     1. Stage 3 chronic kidney disease, unspecified whether stage 3a or 3b CKD (HCC) (Primary)-I have reviewed the kidney function/GFR for the last 10 years.  It has been stable..  -     Lipid Panel; Future; Expected date: 02/18/2025  -     TSH W Reflex To Free T4; Future; Expected date: 02/18/2025  2. ILD (interstitial lung disease) ([facility]) continue to follow-up with pulmonary.  Overview:  Dr Dotson following   Orders:  -     Lipid Panel; Future; Expected date: 02/18/2025  -     TSH W Reflex To Free T4; Future; Expected date: 02/18/2025  3. Hypercholesterolemia-diet controlled.  Check lipid profile.  -     Lipid Panel; Future; Expected date: 02/18/2025  -     TSH W Reflex To Free T4; Future; Expected date: 02/18/2025  4. Melanoma in situ of cheek ([facility]) and other skin conditions-follows up with Dr. Gandara.  Overview:  Follows up with Dr Gandara on an yearly basis    Assessment & Plan  Chronic Kidney Disease  Stable creatinine levels over the past decade with no significant changes that would impact health. No  nephrology follow-up has been established.  -Monitor kidney function regularly.  -Encourage adequate hydration.    History of Melanoma  Previous melanoma on the cheek, excised and followed up by Dr. Gandara annually.  -Continue annual follow-ups with Dr. Gandara.    History of Colon Cancer  Resection in 2016 with no further colonoscopies needed as per Dr. Gongora.  -No further action required.    Interstitial Lung Disease  Under the care of Dr. Tyler, currently being monitored.  -Continue monitoring with Dr. Tyler.    General Health Maintenance  -Order thyroid test and cholesterol panel.  -Schedule follow-up appointment in September 2025.  -Encourage physical activity and balanced diet.  The patient indicates understanding of these issues and agrees to the plan.  Reinforced healthy diet, lifestyle, and exercise.      No follow-ups on file.     Rosalio Epperson MD, 2/18/2025     Supplementary Documentation:   General Health:  In the past six months, have you lost more than 10 pounds without trying?: 2 - No  Has your appetite been poor?: No  Type of Diet: Balanced  How does the patient maintain a good energy level?: Appropriate Exercise;Daily Walks;Stretching  How would you describe your daily physical activity?: Light  How would you describe your current health state?: Good  How do you maintain positive mental well-being?: Visiting Family;Social Interaction;Visiting Friends;Games;Puzzles  On a scale of 0 to 10, with 0 being no pain and 10 being severe pain, what is your pain level?: 0 - (None)  In the past six months, have you experienced urine leakage?: 1-Yes  At any time do you feel concerned for the safety/well-being of yourself and/or your children, in your home or elsewhere?: No  Have you had any immunizations at another office such as Influenza, Hepatitis B, Tetanus, or Pneumococcal?: No    Health Maintenance   Topic Date Due    Annual Physical  02/16/2025    COVID-19 Vaccine (9 - 2024-25 season) 03/12/2025     Influenza Vaccine  Completed    Annual Depression Screening  Completed    Fall Risk Screening (Annual)  Completed    Pneumococcal Vaccine: 50+ Years  Completed    Zoster Vaccines  Completed    Meningococcal B Vaccine  Aged Out    Colonoscopy  Discontinued

## 2025-02-28 ENCOUNTER — PATIENT MESSAGE (OUTPATIENT)
Dept: DERMATOLOGY CLINIC | Facility: CLINIC | Age: 85
End: 2025-02-28

## 2025-03-14 ENCOUNTER — PATIENT MESSAGE (OUTPATIENT)
Dept: PULMONOLOGY | Facility: CLINIC | Age: 85
End: 2025-03-14

## 2025-03-18 ENCOUNTER — LAB ENCOUNTER (OUTPATIENT)
Dept: LAB | Facility: HOSPITAL | Age: 85
End: 2025-03-18
Attending: INTERNAL MEDICINE
Payer: MEDICARE

## 2025-03-18 DIAGNOSIS — N18.30 STAGE 3 CHRONIC KIDNEY DISEASE, UNSPECIFIED WHETHER STAGE 3A OR 3B CKD (HCC): ICD-10-CM

## 2025-03-18 DIAGNOSIS — J84.9 ILD (INTERSTITIAL LUNG DISEASE) (HCC): ICD-10-CM

## 2025-03-18 DIAGNOSIS — E78.00 HYPERCHOLESTEROLEMIA: ICD-10-CM

## 2025-03-18 LAB
CHOLEST SERPL-MCNC: 189 MG/DL (ref ?–200)
FASTING PATIENT LIPID ANSWER: YES
HDLC SERPL-MCNC: 39 MG/DL (ref 40–59)
LDLC SERPL CALC-MCNC: 126 MG/DL (ref ?–100)
NONHDLC SERPL-MCNC: 150 MG/DL (ref ?–130)
TRIGL SERPL-MCNC: 132 MG/DL (ref 30–149)
TSI SER-ACNC: 2.81 UIU/ML (ref 0.55–4.78)
VLDLC SERPL CALC-MCNC: 24 MG/DL (ref 0–30)

## 2025-03-18 PROCEDURE — 80061 LIPID PANEL: CPT

## 2025-03-18 PROCEDURE — 84443 ASSAY THYROID STIM HORMONE: CPT

## 2025-03-18 PROCEDURE — 36415 COLL VENOUS BLD VENIPUNCTURE: CPT

## 2025-03-20 RX ORDER — ALBUTEROL SULFATE 90 UG/1
INHALANT RESPIRATORY (INHALATION)
Qty: 3 EACH | Refills: 4 | Status: SHIPPED | OUTPATIENT
Start: 2025-03-20

## 2025-03-20 NOTE — TELEPHONE ENCOUNTER
Last office visit 10/3/24  Tiotropium Bromide-Olodaterol 2.5-2.5 mcg/act inhalation aero soln inhale 2 puffs into the lungs daily last refill 2/11/25 for quantity #3, refill #1.  Albuterol 108 mcg/act inhalation aero soln last refill 10/26/22.    Dr. Dotson- please sign order if agreeable. Thanks.

## 2025-04-16 ENCOUNTER — MED REC SCAN ONLY (OUTPATIENT)
Dept: INTERNAL MEDICINE CLINIC | Facility: CLINIC | Age: 85
End: 2025-04-16

## 2025-04-21 NOTE — TELEPHONE ENCOUNTER
CMN form faxed to [facility]. Fax confirmation received.
CMN form, overnight oximetry results, and HME O2 order form sent to HIM for scanning
Received CMN form for nocturnal oxygen. Form placed in [facility] PA-C's folder to review/sign.
PACU HOME

## 2025-05-13 RX ORDER — PREDNISONE 20 MG/1
TABLET ORAL
Qty: 10 TABLET | Refills: 0 | Status: SHIPPED | OUTPATIENT
Start: 2025-05-13

## 2025-05-13 NOTE — TELEPHONE ENCOUNTER
Dr. Dotson-patient requesting short course of prednisone. Order pended if agreeable.     Freeman Dominique to [facility] Pulmo Clinical Staff (supporting Piotr Dotson MD)        5/5/25  2:09 PM  In the last week or so, I am experiencing an increase of shortness of breath, even without  exercise. My exercise is important to me for my general and mental wellbeing. I am using my Abuterol Sulfate inhaler once a day or more if needed. Do I need a stronger medicine or inhaler for my broncia?

## 2025-05-14 NOTE — TELEPHONE ENCOUNTER
Spoke with patient. Patient wanting to go over instructions on how to take Prednisone. Reviewed instructions with patient and informed to call Pulmonary office with update after taking Prednisone. Patient verbalized understanding.

## 2025-06-17 ENCOUNTER — TELEPHONE (OUTPATIENT)
Dept: PULMONOLOGY | Facility: CLINIC | Age: 85
End: 2025-06-17

## 2025-06-17 DIAGNOSIS — R06.02 SHORTNESS OF BREATH: ICD-10-CM

## 2025-06-17 DIAGNOSIS — I25.5 ISCHEMIC CARDIOMYOPATHY: Primary | ICD-10-CM

## 2025-06-17 NOTE — TELEPHONE ENCOUNTER
Called patient and states prednisone was not helpful- still having increased in shortness of breath, patient states worsens with activity. Patient currently using albuterol  and tiotropium bromide-olodaterol daily but not much help. Patient states no other symptoms- he also reports concerns about atherosclerotic vascular calcifications seen on last Chest xray wanted to know if Dr. Dotson thinks it would be a good idea for him to see a cardiologist and if so does he have a recommendation. Pt also willing to be seen sooner if Dr. Dotson feels necessary- has appt for October, did you want patient to be added on 6/24 at 4:45pm?    From 5/5/25 telephone encounter:    Freeman Dominique to [facility] Clinical Staff (supporting Piotr Dotson MD)        5/23/25  7:38 AM  I finished the prednisone and called back hoping to talk to Dr. Dotson's nurse about how I felt. Well, with no answer from the nurse, I have to say that there was no appreciable difference in the way I feel. Where do we go from here?

## 2025-06-17 NOTE — TELEPHONE ENCOUNTER
RN, yes you can get the patient in sooner.  Please just order a Lexiscan stress test to screen for ischemic cardiomyopathy.  Diagnosis dyspnea.

## 2025-06-18 ENCOUNTER — LAB ENCOUNTER (OUTPATIENT)
Dept: LAB | Facility: HOSPITAL | Age: 85
End: 2025-06-18
Attending: FAMILY MEDICINE
Payer: MEDICARE

## 2025-06-18 DIAGNOSIS — Z77.120 CONTACT WITH AND (SUSPECTED) EXPOSURE TO MOLD (TOXIC): ICD-10-CM

## 2025-06-18 DIAGNOSIS — J43.9 EMPHYSEMA OF LUNG (HCC): ICD-10-CM

## 2025-06-18 DIAGNOSIS — J84.170 INTERSTITIAL LUNG DISEASE WITH PROGRESSIVE FIBROTIC PHENOTYPE IN DISEASES CLASSIFIED ELSEWHERE (HCC): Primary | ICD-10-CM

## 2025-06-18 DIAGNOSIS — R19.5 ABNORMAL FECES: ICD-10-CM

## 2025-06-18 DIAGNOSIS — R53.82 CHRONIC FATIGUE: ICD-10-CM

## 2025-06-18 LAB
ALBUMIN SERPL-MCNC: 4.8 G/DL (ref 3.2–4.8)
ALBUMIN/GLOB SERPL: 1.9 {RATIO} (ref 1–2)
ALP LIVER SERPL-CCNC: 100 U/L (ref 45–117)
ALT SERPL-CCNC: 21 U/L (ref 10–49)
ANION GAP SERPL CALC-SCNC: 10 MMOL/L (ref 0–18)
AST SERPL-CCNC: 28 U/L (ref ?–34)
BILIRUB SERPL-MCNC: 0.5 MG/DL (ref 0.2–1.1)
BUN BLD-MCNC: 20 MG/DL (ref 9–23)
BUN/CREAT SERPL: 14.7 (ref 10–20)
CALCIUM BLD-MCNC: 10.1 MG/DL (ref 8.7–10.4)
CHLORIDE SERPL-SCNC: 103 MMOL/L (ref 98–112)
CO2 SERPL-SCNC: 28 MMOL/L (ref 21–32)
CORTIS SERPL-MCNC: 12.8 UG/DL
CREAT BLD-MCNC: 1.36 MG/DL (ref 0.7–1.3)
CREAT UR-SCNC: 25 MG/DL
CRP SERPL HS-MCNC: 3.79 MG/L (ref ?–3)
DHEA-S SERPL-MCNC: 39.2 UG/DL (ref 34.5–568.9)
EGFRCR SERPLBLD CKD-EPI 2021: 51 ML/MIN/1.73M2 (ref 60–?)
ESTRADIOL SERPL-MCNC: 58.2 PG/ML (ref ?–39.8)
FASTING STATUS PATIENT QL REPORTED: NO
GLOBULIN PLAS-MCNC: 2.5 G/DL (ref 2–3.5)
GLUCOSE BLD-MCNC: 89 MG/DL (ref 70–99)
HCYS SERPL-SCNC: 14.1 UMOL/L (ref 3.7–13.9)
MAGNESIUM SERPL-MCNC: 2.1 MG/DL (ref 1.6–2.6)
MICROALBUMIN UR-MCNC: <0.3 MG/DL
OSMOLALITY SERPL CALC.SUM OF ELEC: 294 MOSM/KG (ref 275–295)
POTASSIUM SERPL-SCNC: 4.1 MMOL/L (ref 3.5–5.1)
PROGEST SERPL-MCNC: 0.41 NG/ML (ref 0.28–1.22)
PROT SERPL-MCNC: 7.3 G/DL (ref 5.7–8.2)
SODIUM SERPL-SCNC: 141 MMOL/L (ref 136–145)
T3FREE SERPL-MCNC: 3.93 PG/ML (ref 2.4–4.2)
T4 FREE SERPL-MCNC: 1.6 NG/DL (ref 0.8–1.7)
THYROPEROXIDASE AB SERPL-ACNC: 32 U/ML (ref ?–60)
TSI SER-ACNC: 2.81 UIU/ML (ref 0.55–4.78)
URATE SERPL-MCNC: 5.9 MG/DL (ref 3.7–9.2)
VIT B12 SERPL-MCNC: 1684 PG/ML (ref 211–911)

## 2025-06-18 PROCEDURE — 84439 ASSAY OF FREE THYROXINE: CPT

## 2025-06-18 PROCEDURE — 84144 ASSAY OF PROGESTERONE: CPT

## 2025-06-18 PROCEDURE — 83090 ASSAY OF HOMOCYSTEINE: CPT

## 2025-06-18 PROCEDURE — 82570 ASSAY OF URINE CREATININE: CPT

## 2025-06-18 PROCEDURE — 84481 FREE ASSAY (FT-3): CPT

## 2025-06-18 PROCEDURE — 82043 UR ALBUMIN QUANTITATIVE: CPT

## 2025-06-18 PROCEDURE — 36415 COLL VENOUS BLD VENIPUNCTURE: CPT

## 2025-06-18 PROCEDURE — 84402 ASSAY OF FREE TESTOSTERONE: CPT

## 2025-06-18 PROCEDURE — 84443 ASSAY THYROID STIM HORMONE: CPT

## 2025-06-18 PROCEDURE — 86644 CMV ANTIBODY: CPT

## 2025-06-18 PROCEDURE — 86141 C-REACTIVE PROTEIN HS: CPT

## 2025-06-18 PROCEDURE — 84550 ASSAY OF BLOOD/URIC ACID: CPT

## 2025-06-18 PROCEDURE — 82533 TOTAL CORTISOL: CPT

## 2025-06-18 PROCEDURE — 80053 COMPREHEN METABOLIC PANEL: CPT

## 2025-06-18 PROCEDURE — 84586 ASSAY OF VIP: CPT

## 2025-06-18 PROCEDURE — 86376 MICROSOMAL ANTIBODY EACH: CPT

## 2025-06-18 PROCEDURE — 82607 VITAMIN B-12: CPT

## 2025-06-18 PROCEDURE — 84425 ASSAY OF VITAMIN B-1: CPT

## 2025-06-18 PROCEDURE — 83735 ASSAY OF MAGNESIUM: CPT

## 2025-06-18 PROCEDURE — 82777 GALECTIN-3: CPT

## 2025-06-18 PROCEDURE — 86645 CMV ANTIBODY IGM: CPT

## 2025-06-18 PROCEDURE — 83520 IMMUNOASSAY QUANT NOS NONAB: CPT

## 2025-06-18 PROCEDURE — 84140 ASSAY OF PREGNENOLONE: CPT

## 2025-06-18 PROCEDURE — 84403 ASSAY OF TOTAL TESTOSTERONE: CPT

## 2025-06-18 PROCEDURE — 86618 LYME DISEASE ANTIBODY: CPT

## 2025-06-18 PROCEDURE — 84207 ASSAY OF VITAMIN B-6: CPT

## 2025-06-18 PROCEDURE — 82955 ASSAY OF G6PD ENZYME: CPT

## 2025-06-18 PROCEDURE — 87798 DETECT AGENT NOS DNA AMP: CPT

## 2025-06-18 PROCEDURE — 82670 ASSAY OF TOTAL ESTRADIOL: CPT

## 2025-06-18 PROCEDURE — 83698 ASSAY LIPOPROTEIN PLA2: CPT

## 2025-06-18 PROCEDURE — 82627 DEHYDROEPIANDROSTERONE: CPT

## 2025-06-18 PROCEDURE — 83519 RIA NONANTIBODY: CPT

## 2025-06-18 NOTE — TELEPHONE ENCOUNTER
Ordered lexiscan stress test- dx dyspnea, and was going to see if he would be able to come in 08/12 at 4:30pm.?

## 2025-06-19 LAB
CMV IGG AB: <0.6 U/ML
CMV IGM AB: <30 AU/ML

## 2025-06-20 LAB
B BURGDOR IGG+IGM SER QL: NEGATIVE
LP-PLA2 ACTIVITY: 174 NMOL/MIN/ML
TRANS. GROWTH FACT. BETA 1*: 7695 PG/ML

## 2025-06-21 LAB
FREE TESTOST DIRECT: 3.1 PG/ML
TESTOSTERONE: 651 NG/DL
VITAMIN B6: 28.2 UG/L

## 2025-06-22 LAB
G6PD QN: 288 U/10E12 RBC
RBC: 4.97 X10E6/UL
VITAMIN B1 WHOLE BLD: 195.5 NMOL/L

## 2025-06-23 LAB
VEGF, PLASMA: 41 PG/ML
VEGF, PLASMA: 41 PG/ML

## 2025-06-24 LAB
EBV PCR REAL TIME: NEGATIVE
PREGNENOLONE: 18 NG/DL

## 2025-06-25 ENCOUNTER — LAB ENCOUNTER (OUTPATIENT)
Dept: LAB | Facility: HOSPITAL | Age: 85
End: 2025-06-25
Attending: FAMILY MEDICINE
Payer: MEDICARE

## 2025-06-25 DIAGNOSIS — R53.82 CHRONIC FATIGUE: ICD-10-CM

## 2025-06-25 DIAGNOSIS — R19.5 ABNORMAL FECES: ICD-10-CM

## 2025-06-25 DIAGNOSIS — J84.170 INTERSTITIAL LUNG DISEASE WITH PROGRESSIVE FIBROTIC PHENOTYPE IN DISEASES CLASSIFIED ELSEWHERE (HCC): Primary | ICD-10-CM

## 2025-06-25 DIAGNOSIS — J43.9 EMPHYSEMA OF LUNG (HCC): ICD-10-CM

## 2025-06-25 DIAGNOSIS — Z77.120 CONTACT WITH AND (SUSPECTED) EXPOSURE TO MOLD (TOXIC): ICD-10-CM

## 2025-06-25 DIAGNOSIS — J43.9 EMPHYSEMATOUS BLEB (HCC): ICD-10-CM

## 2025-06-25 DIAGNOSIS — Z77.120 CONTACT WITH MOLD: ICD-10-CM

## 2025-06-25 LAB
BASOPHILS # BLD AUTO: 0.07 X10(3) UL (ref 0–0.2)
BASOPHILS NFR BLD AUTO: 0.7 %
DEPRECATED RDW RBC AUTO: 47.4 FL (ref 35.1–46.3)
EOSINOPHIL # BLD AUTO: 0.15 X10(3) UL (ref 0–0.7)
EOSINOPHIL NFR BLD AUTO: 1.6 %
ERYTHROCYTE [DISTWIDTH] IN BLOOD BY AUTOMATED COUNT: 14.2 % (ref 11–15)
HCT VFR BLD AUTO: 45.6 % (ref 39–53)
HGB BLD-MCNC: 14.3 G/DL (ref 13–17.5)
IMM GRANULOCYTES # BLD AUTO: 0.03 X10(3) UL (ref 0–1)
IMM GRANULOCYTES NFR BLD: 0.3 %
INSULIN SERPL-ACNC: 21.6 MU/L (ref 3–25)
LYMPHOCYTES # BLD AUTO: 1.74 X10(3) UL (ref 1–4)
LYMPHOCYTES NFR BLD AUTO: 18.1 %
Lab: 18 PG/ML
Lab: 18 PG/ML
MCH RBC QN AUTO: 28.7 PG (ref 26–34)
MCHC RBC AUTO-ENTMCNC: 31.4 G/DL (ref 31–37)
MCV RBC AUTO: 91.6 FL (ref 80–100)
MONOCYTES # BLD AUTO: 0.63 X10(3) UL (ref 0.1–1)
MONOCYTES NFR BLD AUTO: 6.6 %
NEUTROPHILS # BLD AUTO: 6.97 X10 (3) UL (ref 1.5–7.7)
NEUTROPHILS # BLD AUTO: 6.97 X10(3) UL (ref 1.5–7.7)
NEUTROPHILS NFR BLD AUTO: 72.7 %
PLATELET # BLD AUTO: 261 10(3)UL (ref 150–450)
RBC # BLD AUTO: 4.98 X10(6)UL (ref 3.8–5.8)
THYROGLOB SERPL-MCNC: 21 U/ML (ref ?–60)
VIP: 31.4 PG/ML
VIP: 31.4 PG/ML
WBC # BLD AUTO: 9.6 X10(3) UL (ref 4–11)

## 2025-06-25 PROCEDURE — 36415 COLL VENOUS BLD VENIPUNCTURE: CPT

## 2025-06-25 PROCEDURE — 83525 ASSAY OF INSULIN: CPT

## 2025-06-25 PROCEDURE — 83520 IMMUNOASSAY QUANT NOS NONAB: CPT

## 2025-06-25 PROCEDURE — 84588 ASSAY OF VASOPRESSIN: CPT

## 2025-06-25 PROCEDURE — 85025 COMPLETE CBC W/AUTO DIFF WBC: CPT

## 2025-06-25 PROCEDURE — 84482 T3 REVERSE: CPT

## 2025-06-25 PROCEDURE — 85385 FIBRINOGEN ANTIGEN: CPT

## 2025-06-25 PROCEDURE — 83516 IMMUNOASSAY NONANTIBODY: CPT

## 2025-06-25 PROCEDURE — 84446 ASSAY OF VITAMIN E: CPT

## 2025-06-25 PROCEDURE — 86037 ANCA TITER EACH ANTIBODY: CPT

## 2025-06-25 PROCEDURE — 86628 CANDIDA ANTIBODY: CPT

## 2025-06-25 PROCEDURE — 83695 ASSAY OF LIPOPROTEIN(A): CPT

## 2025-06-25 PROCEDURE — 86800 THYROGLOBULIN ANTIBODY: CPT

## 2025-06-26 LAB — LIPOPROTEIN (A): 40.3 NMOL/L

## 2025-06-27 LAB
ANTI-MPO ANTIBODIES: <0.2 UNITS
ANTI-PR3 ANTIBODIES: <0.2 UNITS
FIBRINOGEN AG: 440 MG/DL
LEPTIN: 7.7 NG/ML

## 2025-06-28 LAB — TOTAL GLUTATHIONE: 250 UG/ML

## 2025-06-29 LAB
CANDIDA IGA AB: NEGATIVE
CANDIDA IGG AB: NEGATIVE
CANDIDA IGM AB IGM: NEGATIVE

## 2025-06-30 LAB — REVERSE T3: 33.2 NG/DL

## 2025-07-02 LAB
ADH: <0.8 PG/ML
VIT E ALPHA TOCO: 31.1 MG/L
VIT E GAMMA TOCO: 1.2 MG/L

## 2025-07-07 ENCOUNTER — HOSPITAL ENCOUNTER (OUTPATIENT)
Dept: NUCLEAR MEDICINE | Facility: HOSPITAL | Age: 85
Discharge: HOME | End: 2025-07-07
Attending: INTERNAL MEDICINE
Payer: MEDICARE

## 2025-07-07 ENCOUNTER — HOSPITAL ENCOUNTER (OUTPATIENT)
Dept: CV DIAGNOSTICS | Facility: HOSPITAL | Age: 85
Discharge: HOME | End: 2025-07-07
Attending: INTERNAL MEDICINE
Payer: MEDICARE

## 2025-07-07 DIAGNOSIS — R06.02 SHORTNESS OF BREATH: ICD-10-CM

## 2025-07-07 PROCEDURE — 78452 HT MUSCLE IMAGE SPECT MULT: CPT | Performed by: INTERNAL MEDICINE

## 2025-07-07 PROCEDURE — 93018 CV STRESS TEST I&R ONLY: CPT | Performed by: INTERNAL MEDICINE

## 2025-07-07 PROCEDURE — 93016 CV STRESS TEST SUPVJ ONLY: CPT | Performed by: INTERNAL MEDICINE

## 2025-07-07 PROCEDURE — 93017 CV STRESS TEST TRACING ONLY: CPT | Performed by: INTERNAL MEDICINE

## 2025-07-07 RX ORDER — REGADENOSON 0.08 MG/ML
INJECTION, SOLUTION INTRAVENOUS
Status: COMPLETED
Start: 2025-07-07 | End: 2025-07-07

## 2025-07-07 RX ADMIN — REGADENOSON 0.4 MG: 0.08 INJECTION, SOLUTION INTRAVENOUS at 08:42:00

## 2025-07-08 LAB
% OF MAX PREDICTED HR: 100 %
MAX DIASTOLIC BP: 63 MMHG
MAX HEART RATE: 93 BPM
MAX PREDICTED HEART RATE: 136 BPM
MAX SYSTOLIC BP: 144 MMHG
MAX WORK LOAD: 10

## 2025-07-10 ENCOUNTER — HOSPITAL ENCOUNTER (OUTPATIENT)
Age: 85
Discharge: HOME | End: 2025-07-10
Payer: MEDICARE

## 2025-07-10 ENCOUNTER — RESULTS FOLLOW-UP (OUTPATIENT)
Dept: PULMONOLOGY | Facility: CLINIC | Age: 85
End: 2025-07-10

## 2025-07-10 VITALS
TEMPERATURE: 98 F | DIASTOLIC BLOOD PRESSURE: 56 MMHG | RESPIRATION RATE: 20 BRPM | HEART RATE: 80 BPM | SYSTOLIC BLOOD PRESSURE: 144 MMHG | OXYGEN SATURATION: 96 %

## 2025-07-10 DIAGNOSIS — H61.20 IMPACTED CERUMEN, UNSPECIFIED LATERALITY: Primary | ICD-10-CM

## 2025-07-10 NOTE — ED PROVIDER NOTES
Patient Seen in: [facility]    History  No chief complaint on file.    Stated Complaint: ear problem    Subjective:   HPI          Patient is a 84 YOM presents with right ear fullness x 4 days, no pain, no URI symptoms or fever.  No hearing aids, but has a history of cerumen buildup requiring irrigation, tried hydrogen peroxide PTA with no relief       Objective:     Past Medical History:    Aortic atherosclerosis    CXR 9-11    Stubbs esophagus    EGD 9-16 with large paraesophageal hiatal hernia, Stubbs's esophagus, biopsy negative for dysplasia    BCC (basal cell carcinoma of skin)    scalp    Carcinoma in situ of colon    transverse    Chronic kidney disease, stage 3 (HCC)    Diverticulosis of large intestine    Dyslipidemia    GERD (gastroesophageal reflux disease)    ILD (interstitial lung disease) ([facility])    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; PFTs 10-22 with severe restriction with TLC 3.08 L (51%)    Iron deficiency anemia    Melanoma in situ of cheek (HCC)    Paraesophageal hiatal hernia    Pneumonia              Past Surgical History:   Procedure Laterality Date    Appendectomy  1956    Cataract extraction Bilateral  2015    Colonoscopy N/A 11/10/2018    Procedure: COLONOSCOPY;  Surgeon: David Gongora MD;  Location: [facility] ENDOSCOPY    Colonoscopy N/A 11/11/2021    Procedure: COLONOSCOPY;  Surgeon: David Gongora MD;  Location: [facility] ENDOSCOPY    Colonoscopy & polypectomy  9/15/16    Egd  9/15/16    Inguinal hernia repair Right 1955    Laparoscopy, surgical; colectomy, partial, w/ anastomos Right October 2016    Repair recurr inguin chapis,reducibl Right 1975    With right orchiectomy    Skin surgery  July 2014    Excision melanoma in situ right cheek and BCCa scalp    Tonsillectomy  1948                Social History     Socioeconomic History    Marital status:     Number of children: 1   Occupational History    Occupation: [employer]   Tobacco Use    Smoking status: Former     Current  packs/day: 0.00     Average packs/day: 1 pack/day for 36.0 years (36.0 ttl pk-yrs)     Types: Cigarettes     Start date:      Quit date: 1992     Years since quittin.0    Smokeless tobacco: Never    Tobacco comments:     Started at age 17   Vaping Use    Vaping status: Never Used   Substance and Sexual Activity    Alcohol use: Yes     Alcohol/week: 0.0 standard drinks of alcohol     Comment: Infrequent    Drug use: No   Other Topics Concern    Grew up on a farm No    History of tanning Yes    Outdoor occupation No    Pt has a pacemaker No    Pt has a defibrillator No    Reaction to local anesthetic No    Caffeine Concern Yes     Comment: Coffee 3 cups daily   Social History Narrative    Lives with wife near hospital    No pets    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      Positive for stated complaint: ear problem  Other systems are as noted in HPI.  Constitutional and vital signs reviewed.      All other systems reviewed and negative except as noted above.                  Physical Exam    ED Triage Vitals [07/10/25 1506]   /56   Pulse 80   Resp 20   Temp 97.5 °F (36.4 °C)   Temp src Oral   SpO2 96 %   O2 Device None (Room air)       Current Vitals:   Vital Signs  BP: 144/56  Pulse: 80  Resp: 20  Temp: 97.5 °F (36.4 °C)  Temp src: Oral    Oxygen Therapy  SpO2: 96 %  O2 Device: None (Room air)            Physical Exam  HENT:       Head: Normocephalic.      Ears:      Comments: Left ear: canal clear, TM unremarkable  Right ear: cerumen impaction overlying TM, no external ear canal swelling/drainage,     No mastoid swelling/ttp     Nose: Nose normal. No congestion.   Cardiovascular:      Rate and Rhythm: Normal rate.   Pulmonary:      Effort: Pulmonary effort is normal.   Skin:     General: Skin is warm.   Neurological:      General: No focal deficit present.      Mental Status: He is alert.   Psychiatric:         Mood and Affect: Mood normal.         Behavior: Behavior normal.         Judgment: Judgment normal.         ED Course  Labs Reviewed - No data to display    MDM    Medical Decision Making  Patient is a 84 YOM presents with right  ear fullness, minimal pain, no URI symptoms  Arrived afebrile HD stable, right cerumen impaction present, irrigation successful, TM intact with no infectious findings    Disposition and Plan     Clinical Impression:  1. Impacted cerumen, unspecified laterality         Disposition:  Discharge  7/10/2025  3:36 pm    Follow-up:  No follow-up provider specified.        Medications Prescribed:  Current Discharge Medication List                Supplementary Documentation:

## 2025-07-10 NOTE — DISCHARGE INSTRUCTIONS
Recommend avoiding any q-tips  May try Debrox, 5-10 drops in affected ear to help loosen and remove wax buildup  Any fever, swelling/redness around ear, bleeding or drainage, follow up for medical attention

## 2025-07-11 NOTE — TELEPHONE ENCOUNTER
I left a message for patient to call me or just make an appointment to see me to go over options.

## 2025-07-23 ENCOUNTER — TELEPHONE (OUTPATIENT)
Dept: PULMONOLOGY | Facility: CLINIC | Age: 85
End: 2025-07-23

## 2025-07-23 NOTE — TELEPHONE ENCOUNTER
Per patient received a message from Dr. Dotson to see him sooner than his appointment 8/12. Please see signed encounter 7/10 and call

## 2025-08-12 ENCOUNTER — OFFICE VISIT (OUTPATIENT)
Dept: PULMONOLOGY | Facility: CLINIC | Age: 85
End: 2025-08-12

## 2025-08-12 ENCOUNTER — TELEPHONE (OUTPATIENT)
Dept: PULMONOLOGY | Facility: CLINIC | Age: 85
End: 2025-08-12

## 2025-08-12 VITALS
OXYGEN SATURATION: 98 % | WEIGHT: 146 LBS | RESPIRATION RATE: 20 BRPM | HEIGHT: 66 IN | SYSTOLIC BLOOD PRESSURE: 137 MMHG | BODY MASS INDEX: 23.46 KG/M2 | DIASTOLIC BLOOD PRESSURE: 63 MMHG | HEART RATE: 59 BPM

## 2025-08-12 DIAGNOSIS — J84.9 ILD (INTERSTITIAL LUNG DISEASE) (HCC): Primary | ICD-10-CM

## 2025-08-12 PROCEDURE — 99213 OFFICE O/P EST LOW 20 MIN: CPT | Performed by: INTERNAL MEDICINE

## 2025-08-12 RX ORDER — NINTEDANIB 150 MG/1
150 CAPSULE ORAL EVERY 12 HOURS
Qty: 60 CAPSULE | Refills: 11 | Status: SHIPPED | OUTPATIENT
Start: 2025-08-12 | End: 2025-09-11

## 2025-08-14 ENCOUNTER — TELEPHONE (OUTPATIENT)
Dept: PULMONOLOGY | Facility: CLINIC | Age: 85
End: 2025-08-14

## 2025-08-21 ENCOUNTER — LAB ENCOUNTER (OUTPATIENT)
Dept: LAB | Facility: HOSPITAL | Age: 85
End: 2025-08-21
Attending: INTERNAL MEDICINE

## 2025-08-21 DIAGNOSIS — J84.9 ILD (INTERSTITIAL LUNG DISEASE) (HCC): ICD-10-CM

## 2025-08-21 LAB
ALBUMIN SERPL-MCNC: 4.7 G/DL (ref 3.2–4.8)
ALP LIVER SERPL-CCNC: 88 U/L (ref 45–117)
ALT SERPL-CCNC: 14 U/L (ref 10–49)
AST SERPL-CCNC: 26 U/L (ref ?–34)
BILIRUB DIRECT SERPL-MCNC: 0.2 MG/DL (ref ?–0.3)
BILIRUB SERPL-MCNC: 0.6 MG/DL (ref 0.2–1.1)
PROT SERPL-MCNC: 7 G/DL (ref 5.7–8.2)

## 2025-08-21 PROCEDURE — 36415 COLL VENOUS BLD VENIPUNCTURE: CPT

## 2025-08-21 PROCEDURE — 80076 HEPATIC FUNCTION PANEL: CPT

## (undated) DEVICE — 35 ML SYRINGE REGULAR TIP: Brand: MONOJECT

## (undated) DEVICE — ENDOSCOPY PACK - LOWER: Brand: MEDLINE INDUSTRIES, INC.

## (undated) DEVICE — Device: Brand: DEFENDO AIR/WATER/SUCTION AND BIOPSY VALVE

## (undated) DEVICE — KIT CLEAN ENDOKIT 1.1OZ GOWNX2

## (undated) DEVICE — LINE MNTR ADLT SET O2 INTMD

## (undated) DEVICE — FORCEP RADIAL JAW 4

## (undated) DEVICE — Device: Brand: CUSTOM PROCEDURE KIT

## (undated) DEVICE — KIT ENDO ORCAPOD 160/180/190

## (undated) DEVICE — SNARE CAPTI HEX STIFF MEDIUM

## (undated) DEVICE — SNARE OPTMZ PLPCTM TRP

## (undated) DEVICE — MEDI-VAC NON-CONDUCTIVE SUCTION TUBING 6MM X 1.8M (6FT.) L: Brand: CARDINAL HEALTH

## (undated) NOTE — LETTER
Navya Fowler  [address]  [address]  [address]      9/21/2022    Dear Fanimohini Sunshine,    It has come to our attention that you are due for: CT in October. This test was ordered by Dr. Bunny Brown. Please proceed as scheduled. If you are allergic to iodine or have any questions, please call the office at [phone].        Thank you,         The Pulmonary Clinical Staff

## (undated) NOTE — LETTER
[address] [place] [address]  Authorization for Invasive Procedures  1. I hereby authorize Dr. Kendra August , my physician and whomever may be designated as the doctor's assistant, to perform the following operation and/or procedure:   Co allergic reactions, hemolytic reactions, transmission of disease such as hepatitis, AIDS, cytomegalovirus (CMV), and flluid overload.  In the event that I wish to have autologous transfusions of my own blood, or a directed donor transfusion, I will discuss Patient:  ________________________________________________ Date: _________Time: _________    Responsible person in case of minor or unconscious: _____________________________Relationship: ____________     Witness Signature: ________________________________

## (undated) NOTE — LETTER
[facility]  Administration of Anesthesia  1. Amor Jesus, or _________________________________ acting on his behalf, (Patient) (Dependent/Representative) request to receive anesthesia for my pending procedure/operation/treatment.   A bleeding, seizure, cardiac arrest and death. 7. AWARENESS: I understand that it is possible (but unlikely) to have explicit memory of events from the operating room while under general anesthesia.   8. ELECTROCONVULSIVE THERAPY PATIENTS: This consent serve below affirms that prior to the time of the procedure, I have explained to the patient and/or his/her guardian, the risks and benefits of undergoing anesthesia, as well as any reasonable alternatives.     ___________________________________________________

## (undated) NOTE — LETTER
11/12/2018              Nicolette Moctezuma        [facility] [address] [facility]        [facility]          Dear Delisa Giles,    I wanted to get back to you with your colonoscopy results. You had 4 colon polyps removed which were benign.   I would advise